# Patient Record
Sex: FEMALE | Race: WHITE | Employment: OTHER | URBAN - NONMETROPOLITAN AREA
[De-identification: names, ages, dates, MRNs, and addresses within clinical notes are randomized per-mention and may not be internally consistent; named-entity substitution may affect disease eponyms.]

---

## 2017-07-23 PROBLEM — C50.511 BREAST CANCER OF LOWER-OUTER QUADRANT OF RIGHT FEMALE BREAST (HCC): Status: ACTIVE | Noted: 2017-07-23

## 2017-07-24 ENCOUNTER — TELEPHONE (OUTPATIENT)
Dept: SURGERY | Age: 62
End: 2017-07-24

## 2017-07-24 ENCOUNTER — OFFICE VISIT (OUTPATIENT)
Dept: SURGERY | Age: 62
End: 2017-07-24
Payer: COMMERCIAL

## 2017-07-24 ENCOUNTER — HOSPITAL ENCOUNTER (OUTPATIENT)
Age: 62
Discharge: HOME | End: 2017-07-24
Payer: COMMERCIAL

## 2017-07-24 VITALS
DIASTOLIC BLOOD PRESSURE: 70 MMHG | BODY MASS INDEX: 37.89 KG/M2 | OXYGEN SATURATION: 98 % | WEIGHT: 250 LBS | SYSTOLIC BLOOD PRESSURE: 166 MMHG | HEART RATE: 110 BPM | TEMPERATURE: 99.3 F | HEIGHT: 68 IN | RESPIRATION RATE: 16 BRPM

## 2017-07-24 DIAGNOSIS — Z01.818 PRE-OP TESTING: ICD-10-CM

## 2017-07-24 DIAGNOSIS — I10 ESSENTIAL HYPERTENSION: Primary | ICD-10-CM

## 2017-07-24 DIAGNOSIS — C50.511 BREAST CANCER OF LOWER-OUTER QUADRANT OF RIGHT FEMALE BREAST (HCC): ICD-10-CM

## 2017-07-24 DIAGNOSIS — I10 ESSENTIAL HYPERTENSION: ICD-10-CM

## 2017-07-24 LAB
ANION GAP SERPL CALCULATED.3IONS-SCNC: 15 MEQ/L (ref 8–16)
BUN BLDV-MCNC: 12 MG/DL (ref 7–22)
CALCIUM SERPL-MCNC: 9 MG/DL (ref 8.5–10.5)
CHLORIDE BLD-SCNC: 103 MEQ/L (ref 98–111)
CO2: 24 MEQ/L (ref 23–33)
CREAT SERPL-MCNC: 0.7 MG/DL (ref 0.4–1.2)
GFR SERPL CREATININE-BSD FRML MDRD: 85 ML/MIN/1.73M2
GLUCOSE BLD-MCNC: 92 MG/DL (ref 70–108)
HCT VFR BLD CALC: 39.3 % (ref 37–47)
HEMOGLOBIN: 13.2 GM/DL (ref 12–16)
POTASSIUM SERPL-SCNC: 4.2 MEQ/L (ref 3.5–5.2)
SODIUM BLD-SCNC: 142 MEQ/L (ref 135–145)

## 2017-07-24 PROCEDURE — 93005 ELECTROCARDIOGRAM TRACING: CPT

## 2017-07-24 PROCEDURE — 99205 OFFICE O/P NEW HI 60 MIN: CPT | Performed by: SURGERY

## 2017-07-24 PROCEDURE — 85014 HEMATOCRIT: CPT

## 2017-07-24 PROCEDURE — 85018 HEMOGLOBIN: CPT

## 2017-07-24 PROCEDURE — 80048 BASIC METABOLIC PNL TOTAL CA: CPT

## 2017-07-24 PROCEDURE — 36415 COLL VENOUS BLD VENIPUNCTURE: CPT

## 2017-07-24 ASSESSMENT — ENCOUNTER SYMPTOMS
CONSTIPATION: 0
VOICE CHANGE: 0
FACIAL SWELLING: 0
WHEEZING: 0
ABDOMINAL PAIN: 0
EYE REDNESS: 0
PHOTOPHOBIA: 0
CHOKING: 0
RECTAL PAIN: 0
CHEST TIGHTNESS: 0
NAUSEA: 0
APNEA: 0
ANAL BLEEDING: 0
EYE ITCHING: 0
TROUBLE SWALLOWING: 0
EYE PAIN: 0
COUGH: 0
SORE THROAT: 0
BLOOD IN STOOL: 0
STRIDOR: 0
SINUS PRESSURE: 0
DIARRHEA: 0
COLOR CHANGE: 0
SHORTNESS OF BREATH: 0
EYE DISCHARGE: 0
ABDOMINAL DISTENTION: 0
BACK PAIN: 0
VOMITING: 0
RHINORRHEA: 0

## 2017-07-25 ENCOUNTER — PREP FOR PROCEDURE (OUTPATIENT)
Dept: SURGERY | Age: 62
End: 2017-07-25

## 2017-07-25 RX ORDER — SODIUM CHLORIDE 0.9 % (FLUSH) 0.9 %
10 SYRINGE (ML) INJECTION EVERY 12 HOURS SCHEDULED
Status: CANCELLED | OUTPATIENT
Start: 2017-07-25

## 2017-07-25 RX ORDER — SODIUM CHLORIDE, SODIUM LACTATE, POTASSIUM CHLORIDE, CALCIUM CHLORIDE 600; 310; 30; 20 MG/100ML; MG/100ML; MG/100ML; MG/100ML
INJECTION, SOLUTION INTRAVENOUS CONTINUOUS
Status: CANCELLED | OUTPATIENT
Start: 2017-07-25

## 2017-07-25 RX ORDER — SODIUM CHLORIDE 0.9 % (FLUSH) 0.9 %
10 SYRINGE (ML) INJECTION PRN
Status: CANCELLED | OUTPATIENT
Start: 2017-07-25

## 2017-07-26 LAB
EKG ATRIAL RATE: 89 BPM
EKG P AXIS: 61 DEGREES
EKG P-R INTERVAL: 162 MS
EKG Q-T INTERVAL: 360 MS
EKG QRS DURATION: 78 MS
EKG QTC CALCULATION (BAZETT): 438 MS
EKG R AXIS: 20 DEGREES
EKG T AXIS: 10 DEGREES
EKG VENTRICULAR RATE: 89 BPM

## 2017-07-27 ENCOUNTER — HOSPITAL ENCOUNTER (OUTPATIENT)
Dept: NUCLEAR MEDICINE | Age: 62
Discharge: HOME | End: 2017-07-27
Payer: COMMERCIAL

## 2017-07-27 ENCOUNTER — ANESTHESIA EVENT (OUTPATIENT)
Dept: OPERATING ROOM | Age: 62
End: 2017-07-27
Payer: COMMERCIAL

## 2017-07-27 ENCOUNTER — ANESTHESIA (OUTPATIENT)
Dept: OPERATING ROOM | Age: 62
End: 2017-07-27
Payer: COMMERCIAL

## 2017-07-27 ENCOUNTER — HOSPITAL ENCOUNTER (OUTPATIENT)
Age: 62
Setting detail: OUTPATIENT SURGERY
Discharge: HOME | End: 2017-07-27
Attending: SURGERY | Admitting: SURGERY
Payer: COMMERCIAL

## 2017-07-27 VITALS
DIASTOLIC BLOOD PRESSURE: 81 MMHG | OXYGEN SATURATION: 100 % | WEIGHT: 246.6 LBS | RESPIRATION RATE: 16 BRPM | TEMPERATURE: 98.6 F | HEART RATE: 76 BPM | BODY MASS INDEX: 37.38 KG/M2 | SYSTOLIC BLOOD PRESSURE: 144 MMHG | HEIGHT: 68 IN

## 2017-07-27 VITALS — DIASTOLIC BLOOD PRESSURE: 65 MMHG | SYSTOLIC BLOOD PRESSURE: 132 MMHG | OXYGEN SATURATION: 95 %

## 2017-07-27 VITALS
SYSTOLIC BLOOD PRESSURE: 201 MMHG | WEIGHT: 246.6 LBS | BODY MASS INDEX: 37.5 KG/M2 | OXYGEN SATURATION: 96 % | TEMPERATURE: 96.3 F | DIASTOLIC BLOOD PRESSURE: 88 MMHG | RESPIRATION RATE: 16 BRPM | HEART RATE: 98 BPM

## 2017-07-27 DIAGNOSIS — C50.511 BREAST CANCER OF LOWER-OUTER QUADRANT OF RIGHT FEMALE BREAST (HCC): ICD-10-CM

## 2017-07-27 DIAGNOSIS — C50.511 BREAST CANCER OF LOWER-OUTER QUADRANT OF RIGHT FEMALE BREAST (HCC): Primary | ICD-10-CM

## 2017-07-27 LAB — POTASSIUM SERPL-SCNC: 4.4 MEQ/L (ref 3.5–5.2)

## 2017-07-27 PROCEDURE — 3430000000 HC RX DIAGNOSTIC RADIOPHARMACEUTICAL: Performed by: SURGERY

## 2017-07-27 PROCEDURE — 38792 RA TRACER ID OF SENTINL NODE: CPT | Performed by: SURGERY

## 2017-07-27 PROCEDURE — 2500000003 HC RX 250 WO HCPCS: Performed by: SURGERY

## 2017-07-27 PROCEDURE — 38792 RA TRACER ID OF SENTINL NODE: CPT

## 2017-07-27 PROCEDURE — 7100000011 HC PHASE II RECOVERY - ADDTL 15 MIN: Performed by: SURGERY

## 2017-07-27 PROCEDURE — 3600000003 HC SURGERY LEVEL 3 BASE: Performed by: SURGERY

## 2017-07-27 PROCEDURE — 19301 PARTIAL MASTECTOMY: CPT | Performed by: SURGERY

## 2017-07-27 PROCEDURE — 88307 TISSUE EXAM BY PATHOLOGIST: CPT

## 2017-07-27 PROCEDURE — 7100000001 HC PACU RECOVERY - ADDTL 15 MIN: Performed by: SURGERY

## 2017-07-27 PROCEDURE — A4648 IMPLANTABLE TISSUE MARKER: HCPCS | Performed by: SURGERY

## 2017-07-27 PROCEDURE — 6370000000 HC RX 637 (ALT 250 FOR IP): Performed by: SURGERY

## 2017-07-27 PROCEDURE — 6360000002 HC RX W HCPCS: Performed by: SURGERY

## 2017-07-27 PROCEDURE — A9541 TC99M SULFUR COLLOID: HCPCS | Performed by: SURGERY

## 2017-07-27 PROCEDURE — 7100000010 HC PHASE II RECOVERY - FIRST 15 MIN: Performed by: SURGERY

## 2017-07-27 PROCEDURE — 6360000002 HC RX W HCPCS: Performed by: ANESTHESIOLOGY

## 2017-07-27 PROCEDURE — 38525 BIOPSY/REMOVAL LYMPH NODES: CPT | Performed by: SURGERY

## 2017-07-27 PROCEDURE — 84132 ASSAY OF SERUM POTASSIUM: CPT

## 2017-07-27 PROCEDURE — 3700000001 HC ADD 15 MINUTES (ANESTHESIA): Performed by: SURGERY

## 2017-07-27 PROCEDURE — 2580000003 HC RX 258: Performed by: SURGERY

## 2017-07-27 PROCEDURE — 3700000000 HC ANESTHESIA ATTENDED CARE: Performed by: SURGERY

## 2017-07-27 PROCEDURE — 3600000013 HC SURGERY LEVEL 3 ADDTL 15MIN: Performed by: SURGERY

## 2017-07-27 PROCEDURE — 2580000003 HC RX 258: Performed by: ANESTHESIOLOGY

## 2017-07-27 PROCEDURE — 36415 COLL VENOUS BLD VENIPUNCTURE: CPT

## 2017-07-27 PROCEDURE — 7100000000 HC PACU RECOVERY - FIRST 15 MIN: Performed by: SURGERY

## 2017-07-27 RX ORDER — KETOROLAC TROMETHAMINE 30 MG/ML
INJECTION, SOLUTION INTRAMUSCULAR; INTRAVENOUS
Status: DISCONTINUED
Start: 2017-07-27 | End: 2017-07-27 | Stop reason: HOSPADM

## 2017-07-27 RX ORDER — KETOROLAC TROMETHAMINE 30 MG/ML
30 INJECTION, SOLUTION INTRAMUSCULAR; INTRAVENOUS EVERY 6 HOURS
Status: DISCONTINUED | OUTPATIENT
Start: 2017-07-27 | End: 2017-07-27 | Stop reason: HOSPADM

## 2017-07-27 RX ORDER — PROPOFOL 10 MG/ML
INJECTION, EMULSION INTRAVENOUS CONTINUOUS PRN
Status: DISCONTINUED | OUTPATIENT
Start: 2017-07-27 | End: 2017-07-27

## 2017-07-27 RX ORDER — MIDAZOLAM HYDROCHLORIDE 1 MG/ML
INJECTION INTRAMUSCULAR; INTRAVENOUS PRN
Status: DISCONTINUED | OUTPATIENT
Start: 2017-07-27 | End: 2017-07-27 | Stop reason: SDUPTHER

## 2017-07-27 RX ORDER — SODIUM CHLORIDE 0.9 % (FLUSH) 0.9 %
10 SYRINGE (ML) INJECTION PRN
Status: DISCONTINUED | OUTPATIENT
Start: 2017-07-27 | End: 2017-07-27 | Stop reason: HOSPADM

## 2017-07-27 RX ORDER — ACETAMINOPHEN 325 MG/1
650 TABLET ORAL EVERY 4 HOURS PRN
Status: DISCONTINUED | OUTPATIENT
Start: 2017-07-27 | End: 2017-07-27 | Stop reason: HOSPADM

## 2017-07-27 RX ORDER — PROPOFOL 10 MG/ML
INJECTION, EMULSION INTRAVENOUS CONTINUOUS PRN
Status: DISCONTINUED | OUTPATIENT
Start: 2017-07-27 | End: 2017-07-27 | Stop reason: SDUPTHER

## 2017-07-27 RX ORDER — MORPHINE SULFATE 2 MG/ML
4 INJECTION, SOLUTION INTRAMUSCULAR; INTRAVENOUS
Status: DISCONTINUED | OUTPATIENT
Start: 2017-07-27 | End: 2017-07-27 | Stop reason: HOSPADM

## 2017-07-27 RX ORDER — FENTANYL CITRATE 50 UG/ML
INJECTION, SOLUTION INTRAMUSCULAR; INTRAVENOUS PRN
Status: DISCONTINUED | OUTPATIENT
Start: 2017-07-27 | End: 2017-07-27 | Stop reason: SDUPTHER

## 2017-07-27 RX ORDER — HYDROCODONE BITARTRATE AND ACETAMINOPHEN 5; 325 MG/1; MG/1
1-2 TABLET ORAL EVERY 6 HOURS PRN
Qty: 40 TABLET | Refills: 0 | Status: SHIPPED | OUTPATIENT
Start: 2017-07-27 | End: 2017-08-02

## 2017-07-27 RX ORDER — SODIUM CHLORIDE, SODIUM LACTATE, POTASSIUM CHLORIDE, CALCIUM CHLORIDE 600; 310; 30; 20 MG/100ML; MG/100ML; MG/100ML; MG/100ML
INJECTION, SOLUTION INTRAVENOUS CONTINUOUS
Status: DISCONTINUED | OUTPATIENT
Start: 2017-07-27 | End: 2017-07-27 | Stop reason: HOSPADM

## 2017-07-27 RX ORDER — MORPHINE SULFATE 2 MG/ML
2 INJECTION, SOLUTION INTRAMUSCULAR; INTRAVENOUS
Status: DISCONTINUED | OUTPATIENT
Start: 2017-07-27 | End: 2017-07-27 | Stop reason: HOSPADM

## 2017-07-27 RX ORDER — ONDANSETRON 2 MG/ML
4 INJECTION INTRAMUSCULAR; INTRAVENOUS EVERY 6 HOURS PRN
Status: DISCONTINUED | OUTPATIENT
Start: 2017-07-27 | End: 2017-07-27 | Stop reason: HOSPADM

## 2017-07-27 RX ORDER — SODIUM CHLORIDE 0.9 % (FLUSH) 0.9 %
10 SYRINGE (ML) INJECTION EVERY 12 HOURS SCHEDULED
Status: DISCONTINUED | OUTPATIENT
Start: 2017-07-27 | End: 2017-07-27 | Stop reason: HOSPADM

## 2017-07-27 RX ORDER — SODIUM CHLORIDE 9 MG/ML
INJECTION, SOLUTION INTRAVENOUS CONTINUOUS PRN
Status: DISCONTINUED | OUTPATIENT
Start: 2017-07-27 | End: 2017-07-27 | Stop reason: SDUPTHER

## 2017-07-27 RX ORDER — HYDROCODONE BITARTRATE AND ACETAMINOPHEN 5; 325 MG/1; MG/1
2 TABLET ORAL EVERY 4 HOURS PRN
Status: DISCONTINUED | OUTPATIENT
Start: 2017-07-27 | End: 2017-07-27 | Stop reason: HOSPADM

## 2017-07-27 RX ORDER — PROPOFOL 10 MG/ML
INJECTION, EMULSION INTRAVENOUS PRN
Status: DISCONTINUED | OUTPATIENT
Start: 2017-07-27 | End: 2017-07-27 | Stop reason: SDUPTHER

## 2017-07-27 RX ORDER — HYDROCODONE BITARTRATE AND ACETAMINOPHEN 5; 325 MG/1; MG/1
1 TABLET ORAL EVERY 4 HOURS PRN
Status: DISCONTINUED | OUTPATIENT
Start: 2017-07-27 | End: 2017-07-27 | Stop reason: HOSPADM

## 2017-07-27 RX ADMIN — MIDAZOLAM HYDROCHLORIDE 2 MG: 1 INJECTION INTRAMUSCULAR; INTRAVENOUS at 08:58

## 2017-07-27 RX ADMIN — FENTANYL CITRATE 100 MCG: 50 INJECTION INTRAMUSCULAR; INTRAVENOUS at 08:43

## 2017-07-27 RX ADMIN — CEFAZOLIN SODIUM 2 G: 2 SOLUTION INTRAVENOUS at 08:42

## 2017-07-27 RX ADMIN — HYDROCODONE BITARTRATE AND ACETAMINOPHEN 1 TABLET: 5; 325 TABLET ORAL at 10:56

## 2017-07-27 RX ADMIN — Medication 1 MILLICURIE: at 07:30

## 2017-07-27 RX ADMIN — SODIUM CHLORIDE, POTASSIUM CHLORIDE, SODIUM LACTATE AND CALCIUM CHLORIDE: 600; 310; 30; 20 INJECTION, SOLUTION INTRAVENOUS at 08:33

## 2017-07-27 RX ADMIN — PROPOFOL 410 MG: 10 INJECTION, EMULSION INTRAVENOUS at 08:45

## 2017-07-27 RX ADMIN — KETOROLAC TROMETHAMINE 30 MG: 30 INJECTION, SOLUTION INTRAMUSCULAR at 10:00

## 2017-07-27 RX ADMIN — SODIUM CHLORIDE: 9 INJECTION, SOLUTION INTRAVENOUS at 08:40

## 2017-07-27 ASSESSMENT — PULMONARY FUNCTION TESTS
PIF_VALUE: 0

## 2017-07-27 ASSESSMENT — PAIN SCALES - GENERAL
PAINLEVEL_OUTOF10: 0
PAINLEVEL_OUTOF10: 0
PAINLEVEL_OUTOF10: 7
PAINLEVEL_OUTOF10: 2
PAINLEVEL_OUTOF10: 6
PAINLEVEL_OUTOF10: 7
PAINLEVEL_OUTOF10: 0

## 2017-07-27 ASSESSMENT — PAIN DESCRIPTION - ORIENTATION: ORIENTATION: RIGHT

## 2017-07-27 ASSESSMENT — PAIN DESCRIPTION - LOCATION: LOCATION: BREAST

## 2017-07-27 ASSESSMENT — PAIN DESCRIPTION - FREQUENCY: FREQUENCY: CONTINUOUS

## 2017-07-27 ASSESSMENT — PAIN DESCRIPTION - DESCRIPTORS: DESCRIPTORS: ACHING

## 2017-07-27 ASSESSMENT — PAIN DESCRIPTION - PAIN TYPE: TYPE: SURGICAL PAIN

## 2017-07-28 ENCOUNTER — TELEPHONE (OUTPATIENT)
Dept: SURGERY | Age: 62
End: 2017-07-28

## 2017-08-01 PROBLEM — Z98.890 S/P LUMPECTOMY, RIGHT BREAST: Status: ACTIVE | Noted: 2017-07-24

## 2017-08-02 ENCOUNTER — OFFICE VISIT (OUTPATIENT)
Dept: SURGERY | Age: 62
End: 2017-08-02

## 2017-08-02 VITALS
OXYGEN SATURATION: 98 % | TEMPERATURE: 98.7 F | BODY MASS INDEX: 37.41 KG/M2 | HEART RATE: 93 BPM | WEIGHT: 246.8 LBS | SYSTOLIC BLOOD PRESSURE: 136 MMHG | HEIGHT: 68 IN | RESPIRATION RATE: 18 BRPM | DIASTOLIC BLOOD PRESSURE: 78 MMHG

## 2017-08-02 DIAGNOSIS — C50.511 MALIGNANT NEOPLASM OF LOWER-OUTER QUADRANT OF RIGHT FEMALE BREAST (HCC): Primary | ICD-10-CM

## 2017-08-02 DIAGNOSIS — Z98.890 S/P LUMPECTOMY, RIGHT BREAST: ICD-10-CM

## 2017-08-02 PROCEDURE — 99024 POSTOP FOLLOW-UP VISIT: CPT | Performed by: SURGERY

## 2017-08-03 ENCOUNTER — TELEPHONE (OUTPATIENT)
Dept: SURGERY | Age: 62
End: 2017-08-03

## 2017-08-07 ENCOUNTER — HOSPITAL ENCOUNTER (OUTPATIENT)
Dept: INFUSION THERAPY | Age: 62
Discharge: HOME | End: 2017-08-07
Payer: COMMERCIAL

## 2017-08-07 ENCOUNTER — OFFICE VISIT (OUTPATIENT)
Dept: ONCOLOGY | Age: 62
End: 2017-08-07
Payer: COMMERCIAL

## 2017-08-07 VITALS
TEMPERATURE: 97.6 F | OXYGEN SATURATION: 100 % | RESPIRATION RATE: 18 BRPM | HEART RATE: 91 BPM | WEIGHT: 249.4 LBS | HEIGHT: 68 IN | BODY MASS INDEX: 37.8 KG/M2 | SYSTOLIC BLOOD PRESSURE: 165 MMHG | DIASTOLIC BLOOD PRESSURE: 85 MMHG

## 2017-08-07 DIAGNOSIS — C50.511 MALIGNANT NEOPLASM OF LOWER-OUTER QUADRANT OF RIGHT FEMALE BREAST (HCC): Primary | ICD-10-CM

## 2017-08-07 DIAGNOSIS — Z98.890 STATUS POST RIGHT BREAST LUMPECTOMY: ICD-10-CM

## 2017-08-07 PROCEDURE — 99205 OFFICE O/P NEW HI 60 MIN: CPT | Performed by: INTERNAL MEDICINE

## 2017-08-07 PROCEDURE — 99211 OFF/OP EST MAY X REQ PHY/QHP: CPT

## 2017-08-11 ENCOUNTER — HOSPITAL ENCOUNTER (OUTPATIENT)
Dept: RADIATION ONCOLOGY | Age: 62
Discharge: HOME | End: 2017-08-11
Payer: COMMERCIAL

## 2017-08-11 PROCEDURE — 99202 OFFICE O/P NEW SF 15 MIN: CPT | Performed by: RADIOLOGY

## 2017-08-15 ASSESSMENT — ENCOUNTER SYMPTOMS
BLOOD IN STOOL: 0
VOMITING: 0
CHEST TIGHTNESS: 0
BACK PAIN: 0
FACIAL SWELLING: 0
WHEEZING: 0
COUGH: 0
NAUSEA: 0
ABDOMINAL PAIN: 0
DIARRHEA: 0
CONSTIPATION: 0
SORE THROAT: 0
EYE DISCHARGE: 0
SHORTNESS OF BREATH: 0
RECTAL PAIN: 0
TROUBLE SWALLOWING: 0
COLOR CHANGE: 0
ABDOMINAL DISTENTION: 0

## 2017-08-21 ENCOUNTER — HOSPITAL ENCOUNTER (OUTPATIENT)
Dept: INFUSION THERAPY | Age: 62
Discharge: HOME | End: 2017-08-21
Payer: COMMERCIAL

## 2017-08-21 ENCOUNTER — OFFICE VISIT (OUTPATIENT)
Dept: ONCOLOGY | Age: 62
End: 2017-08-21
Payer: COMMERCIAL

## 2017-08-21 VITALS
DIASTOLIC BLOOD PRESSURE: 87 MMHG | HEART RATE: 93 BPM | HEIGHT: 68 IN | TEMPERATURE: 98.2 F | BODY MASS INDEX: 37.47 KG/M2 | OXYGEN SATURATION: 99 % | WEIGHT: 247.2 LBS | RESPIRATION RATE: 18 BRPM | SYSTOLIC BLOOD PRESSURE: 170 MMHG

## 2017-08-21 DIAGNOSIS — C50.511 MALIGNANT NEOPLASM OF LOWER-OUTER QUADRANT OF RIGHT FEMALE BREAST (HCC): ICD-10-CM

## 2017-08-21 DIAGNOSIS — Z98.890 STATUS POST RIGHT BREAST LUMPECTOMY: Primary | ICD-10-CM

## 2017-08-21 PROCEDURE — 99213 OFFICE O/P EST LOW 20 MIN: CPT | Performed by: INTERNAL MEDICINE

## 2017-08-21 PROCEDURE — 99211 OFF/OP EST MAY X REQ PHY/QHP: CPT

## 2017-08-21 ASSESSMENT — ENCOUNTER SYMPTOMS
COLOR CHANGE: 0
DIARRHEA: 0
FACIAL SWELLING: 0
SHORTNESS OF BREATH: 0
WHEEZING: 0
BLOOD IN STOOL: 0
NAUSEA: 0
CHEST TIGHTNESS: 0
RECTAL PAIN: 0
SORE THROAT: 0
VOMITING: 0
EYE DISCHARGE: 0
TROUBLE SWALLOWING: 0
ABDOMINAL PAIN: 0
COUGH: 0
BACK PAIN: 0
CONSTIPATION: 0
ABDOMINAL DISTENTION: 0

## 2017-09-05 ENCOUNTER — HOSPITAL ENCOUNTER (OUTPATIENT)
Dept: RADIATION ONCOLOGY | Age: 62
Discharge: HOME | End: 2017-09-05
Payer: COMMERCIAL

## 2017-09-05 ENCOUNTER — HOSPITAL ENCOUNTER (OUTPATIENT)
Dept: CT IMAGING | Age: 62
Discharge: HOME | End: 2017-09-05
Payer: COMMERCIAL

## 2017-09-05 DIAGNOSIS — C50.511 MALIGNANT NEOPLASM OF LOWER-OUTER QUADRANT OF RIGHT FEMALE BREAST (HCC): ICD-10-CM

## 2017-09-05 PROCEDURE — 77290 THER RAD SIMULAJ FIELD CPLX: CPT | Performed by: RADIOLOGY

## 2017-09-05 PROCEDURE — 3209999900 CT GUIDE RADIATION THERAPY NO CHARGE

## 2017-09-05 PROCEDURE — 77334 RADIATION TREATMENT AID(S): CPT | Performed by: RADIOLOGY

## 2017-09-06 ENCOUNTER — OFFICE VISIT (OUTPATIENT)
Dept: SURGERY | Age: 62
End: 2017-09-06

## 2017-09-06 VITALS
HEIGHT: 68 IN | SYSTOLIC BLOOD PRESSURE: 124 MMHG | RESPIRATION RATE: 18 BRPM | TEMPERATURE: 99.5 F | BODY MASS INDEX: 37.41 KG/M2 | OXYGEN SATURATION: 97 % | DIASTOLIC BLOOD PRESSURE: 76 MMHG | HEART RATE: 84 BPM | WEIGHT: 246.8 LBS

## 2017-09-06 DIAGNOSIS — Z98.890 S/P LUMPECTOMY, RIGHT BREAST: ICD-10-CM

## 2017-09-06 DIAGNOSIS — C50.511 MALIGNANT NEOPLASM OF LOWER-OUTER QUADRANT OF RIGHT FEMALE BREAST (HCC): Primary | ICD-10-CM

## 2017-09-06 PROCEDURE — 99024 POSTOP FOLLOW-UP VISIT: CPT | Performed by: SURGERY

## 2017-09-11 ENCOUNTER — HOSPITAL ENCOUNTER (OUTPATIENT)
Dept: RADIATION ONCOLOGY | Age: 62
Discharge: HOME | End: 2017-09-11
Payer: COMMERCIAL

## 2017-09-11 PROCEDURE — 77295 3-D RADIOTHERAPY PLAN: CPT | Performed by: RADIOLOGY

## 2017-09-11 PROCEDURE — 77300 RADIATION THERAPY DOSE PLAN: CPT | Performed by: RADIOLOGY

## 2017-09-13 PROCEDURE — 77280 THER RAD SIMULAJ FIELD SMPL: CPT | Performed by: RADIOLOGY

## 2017-09-13 PROCEDURE — 77412 RADIATION TX DELIVERY LVL 3: CPT | Performed by: RADIOLOGY

## 2017-09-13 PROCEDURE — 77334 RADIATION TREATMENT AID(S): CPT | Performed by: RADIOLOGY

## 2017-09-14 PROCEDURE — 77412 RADIATION TX DELIVERY LVL 3: CPT

## 2017-09-15 PROCEDURE — 77412 RADIATION TX DELIVERY LVL 3: CPT | Performed by: RADIOLOGY

## 2017-09-18 ENCOUNTER — HOSPITAL ENCOUNTER (OUTPATIENT)
Dept: RADIATION ONCOLOGY | Age: 62
Discharge: HOME | End: 2017-09-18
Payer: COMMERCIAL

## 2017-09-18 PROCEDURE — 77412 RADIATION TX DELIVERY LVL 3: CPT | Performed by: RADIOLOGY

## 2017-09-18 PROCEDURE — 77336 RADIATION PHYSICS CONSULT: CPT | Performed by: RADIOLOGY

## 2017-09-19 PROCEDURE — 77412 RADIATION TX DELIVERY LVL 3: CPT | Performed by: RADIOLOGY

## 2017-09-20 PROCEDURE — 77412 RADIATION TX DELIVERY LVL 3: CPT | Performed by: RADIOLOGY

## 2017-09-20 PROCEDURE — 77417 THER RADIOLOGY PORT IMAGE(S): CPT | Performed by: RADIOLOGY

## 2017-09-21 PROCEDURE — 77412 RADIATION TX DELIVERY LVL 3: CPT | Performed by: RADIOLOGY

## 2017-09-22 ENCOUNTER — HOSPITAL ENCOUNTER (OUTPATIENT)
Dept: CT IMAGING | Age: 62
Discharge: HOME | End: 2017-09-22
Payer: COMMERCIAL

## 2017-09-22 DIAGNOSIS — C50.511 MALIGNANT NEOPLASM OF LOWER-OUTER QUADRANT OF RIGHT FEMALE BREAST (HCC): ICD-10-CM

## 2017-09-22 PROCEDURE — 3209999900 CT GUIDE RADIATION THERAPY NO CHARGE

## 2017-09-22 PROCEDURE — 77290 THER RAD SIMULAJ FIELD CPLX: CPT | Performed by: RADIOLOGY

## 2017-09-22 PROCEDURE — 77412 RADIATION TX DELIVERY LVL 3: CPT | Performed by: RADIOLOGY

## 2017-09-22 PROCEDURE — 77334 RADIATION TREATMENT AID(S): CPT | Performed by: RADIOLOGY

## 2017-09-25 ENCOUNTER — HOSPITAL ENCOUNTER (OUTPATIENT)
Dept: RADIATION ONCOLOGY | Age: 62
Discharge: HOME | End: 2017-09-25
Payer: COMMERCIAL

## 2017-09-25 PROCEDURE — 77412 RADIATION TX DELIVERY LVL 3: CPT | Performed by: RADIOLOGY

## 2017-09-25 PROCEDURE — 77336 RADIATION PHYSICS CONSULT: CPT | Performed by: RADIOLOGY

## 2017-09-26 PROCEDURE — 77412 RADIATION TX DELIVERY LVL 3: CPT | Performed by: RADIOLOGY

## 2017-09-27 PROCEDURE — 77417 THER RADIOLOGY PORT IMAGE(S): CPT | Performed by: RADIOLOGY

## 2017-09-27 PROCEDURE — 77412 RADIATION TX DELIVERY LVL 3: CPT | Performed by: RADIOLOGY

## 2017-09-28 PROCEDURE — 77334 RADIATION TREATMENT AID(S): CPT | Performed by: RADIOLOGY

## 2017-09-28 PROCEDURE — 77412 RADIATION TX DELIVERY LVL 3: CPT | Performed by: RADIOLOGY

## 2017-09-28 PROCEDURE — 77321 SPECIAL TELETX PORT PLAN: CPT | Performed by: RADIOLOGY

## 2017-09-29 PROCEDURE — 77412 RADIATION TX DELIVERY LVL 3: CPT | Performed by: RADIOLOGY

## 2017-10-02 ENCOUNTER — HOSPITAL ENCOUNTER (OUTPATIENT)
Dept: RADIATION ONCOLOGY | Age: 62
Discharge: HOME | End: 2017-10-02
Payer: COMMERCIAL

## 2017-10-02 ENCOUNTER — HOSPITAL ENCOUNTER (OUTPATIENT)
Dept: INFUSION THERAPY | Age: 62
Discharge: HOME | End: 2017-10-02
Payer: COMMERCIAL

## 2017-10-02 ENCOUNTER — OFFICE VISIT (OUTPATIENT)
Dept: ONCOLOGY | Age: 62
End: 2017-10-02
Payer: COMMERCIAL

## 2017-10-02 ENCOUNTER — NURSE ONLY (OUTPATIENT)
Dept: INFUSION THERAPY | Age: 62
End: 2017-10-02

## 2017-10-02 VITALS
BODY MASS INDEX: 37.19 KG/M2 | SYSTOLIC BLOOD PRESSURE: 156 MMHG | HEIGHT: 68 IN | RESPIRATION RATE: 18 BRPM | HEART RATE: 80 BPM | WEIGHT: 245.4 LBS | OXYGEN SATURATION: 100 % | TEMPERATURE: 97.6 F | DIASTOLIC BLOOD PRESSURE: 83 MMHG

## 2017-10-02 DIAGNOSIS — Z98.890 S/P LUMPECTOMY, RIGHT BREAST: Primary | ICD-10-CM

## 2017-10-02 DIAGNOSIS — Z79.811 PROPHYLACTIC USE OF ANASTROZOLE (ARIMIDEX): ICD-10-CM

## 2017-10-02 DIAGNOSIS — C50.511 MALIGNANT NEOPLASM OF LOWER-OUTER QUADRANT OF RIGHT FEMALE BREAST (HCC): ICD-10-CM

## 2017-10-02 PROCEDURE — 99211 OFF/OP EST MAY X REQ PHY/QHP: CPT

## 2017-10-02 PROCEDURE — 99214 OFFICE O/P EST MOD 30 MIN: CPT | Performed by: INTERNAL MEDICINE

## 2017-10-02 PROCEDURE — 77412 RADIATION TX DELIVERY LVL 3: CPT

## 2017-10-02 PROCEDURE — 77336 RADIATION PHYSICS CONSULT: CPT | Performed by: RADIOLOGY

## 2017-10-02 ASSESSMENT — ENCOUNTER SYMPTOMS
BLOOD IN STOOL: 0
FACIAL SWELLING: 0
SORE THROAT: 0
EYE DISCHARGE: 0
BACK PAIN: 0
VOMITING: 0
RECTAL PAIN: 0
DIARRHEA: 0
CONSTIPATION: 0
SHORTNESS OF BREATH: 0
ABDOMINAL DISTENTION: 0
COLOR CHANGE: 0
TROUBLE SWALLOWING: 0
ABDOMINAL PAIN: 0
NAUSEA: 0
COUGH: 0
WHEEZING: 0
CHEST TIGHTNESS: 0

## 2017-10-02 NOTE — MR AVS SNAPSHOT
Your BMI as listed above is considered obese (30 or more). BMI is an estimate of body fat, calculated from your height and weight. The higher your BMI, the greater your risk of heart disease, high blood pressure, type 2 diabetes, stroke, gallstones, arthritis, sleep apnea, and certain cancers. BMI is not perfect. It may overestimate body fat in athletes and people who are more muscular. Even a small weight loss (between 5 and 10 percent of your current weight) by decreasing your calorie intake and becoming more physically active will help lower your risk of developing or worsening diseases associated with obesity. Learn more at: [email or web address]          Instructions    1. Start Arimidex November 1, 2017  2. Bone density sometime in November 2017  3. RTC in 4 months            Medications and Orders      Your Current Medications Are              losartan (COZAAR) 100 MG tablet Take 100 mg by mouth daily    aspirin 81 MG chewable tablet Take 81 mg by mouth daily    Multiple Vitamins-Minerals (THERAPEUTIC MULTIVITAMIN-MINERALS) tablet Take 1 tablet by mouth daily    doxycycline Monohydrate (VIBRAMYCIN) 25 MG/5ML SUSR suspension Take 50 mg by mouth daily    calcium carbonate 600 MG TABS tablet Take 1 tablet by mouth daily      Allergies           No Known Allergies         Additional Information        Basic Information     Date Of Birth Sex Race Ethnicity Preferred Language Preferred Written Language    1955 Female White Non-/Non  English English      Problem List as of 10/2/2017                 S/P lumpectomy and sln , right breast    Malignant neoplasm of lower-outer quadrant of right female breast [facility])      Preventive Care        Date Due    Hepatitis C screening is recommended for all adults regardless of risk factors born between [facility] at least once (lifetime) who have never been tested.  1955 HIV screening is recommended for all people regardless of risk factors  aged 15-65 years at least once (lifetime) who have never been HIV tested. 2/2/1970    Tetanus Combination Vaccine (1 - Tdap) 2/2/1974    Cholesterol Screening 2/2/1995    Colonoscopy 2/2/2005    Zoster Vaccine 2/2/2015    Yearly Flu Vaccine (1) 9/1/2017    Mammograms are recommended every 2 years for low/average risk patients aged 48 - 69, and every year for high risk patients per updated national guidelines. However these guidelines can be individualized by your provider. 7/10/2018            path intelligencehart Signup           [employer] allows you to send messages to your doctor, view your test results, renew your prescriptions, schedule appointments, view visit notes, and more. How Do I Sign Up? 1. In your Internet browser, go to [email or web address]  2. Click on the Sign Up Now link in the Sign In box. You will see the New Member Sign Up page. 3. Enter your [employer] Access Code exactly as it appears below. You will not need to use this code after youve completed the sign-up process. If you do not sign up before the expiration date, you must request a new code. [employer] Access Code: [email or web address]  Expires: 11/17/2017  5:00 AM    4. Enter your Social Security Number ([id number]) and Date of Birth (mm/dd/yyyy) as indicated and click Submit. You will be taken to the next sign-up page. 5. Create a [employer] ID. This will be your [employer] login ID and cannot be changed, so think of one that is secure and easy to remember. 6. Create a [employer] password. You can change your password at any time. 7. Enter your Password Reset Question and Answer. This can be used at a later time if you forget your password. 8. Enter your e-mail address. You will receive e-mail notification when new information is available in [address] 9. Click Sign Up. You can now view your medical record.      Additional Information If you have questions, please contact the physician practice where you receive care. Remember, MyChart is NOT to be used for urgent needs. For medical emergencies, dial 911. For questions regarding your MyChart account call [phone]. If you have a clinical question, please call your doctor's office.

## 2017-10-02 NOTE — PATIENT INSTRUCTIONS
1.  Start Arimidex November 1, 2017  2. Bone density sometime in November 2017  3.   RTC in 4 months

## 2017-10-02 NOTE — PROGRESS NOTES
7/10/2017 CLINICAL: Post biopsy clip placement, right breast.  Comparison is made to exams dated:  7/10/2017 ultrasound biopsy, 7/10/2017 aspiration, 7/10/2017 ultrasound biopsy, 2017 ultrasound, 2017 mammogram - [address], and 3/5/2010 mammogram - [facility]. There are scattered fibroglandular elements in the right breast that could obscure a lesion on mammography. Current study was also evaluated with a Computer Aided Detection (CAD) system. There is a barbell marker clip in the appropriate position in the  right axillary tail. This marker clip placement is at biopsy site. There also is a tribell marker clip in the appropriate position in the right breast lower outer aspect posterior depth. This marker clip placement is at biopsy site. Salima Santana M.D.          pgk/:7/10/2017 10:16:17  copy to: Ladonna Dakins MD, Norma Yadav MD, [address] [place] [address]., ph: [phone], fax: [phone] Imaging Technologist: Avery Vazquez RT(R)(M)[facility], [address] [facility] Breast Biopsy Ultrasound    Result Date: 7/10/2017  THIS REPORT HAS BEEN AMENDED. AMENDMENT: 2017   Salima Santana M.D. Surgical pathology reports invasive ductal carcinoma, grade 1 The sonographic features are concordant BIRADS 6 Surgical referral recommended IMPRESSION: ULTRASOUND GUIDED BIOPSY 1. Ultrasound guided biopsy of the mass in the right breast lower  outer aspect posterior depth was successful with no apparent post procedure complications. 2. Waiting for pathology results. A final report will be issued when these become available. #[id number] - [facility] BREAST BIOPSY ULTRASOUND GUIDED BIOPSY RIGHT BREAST WITH MARKING DEVICE INSERTED AND POST DIGITAL MAMMOGRAPHIC IMAGIN/10/2017 CLINICAL: Mass right breast, tribell clip placed. Correlation is made to exam dated:  2017 ultrasound - [address]   An ultrasound guided biopsy using real-time ultrasound was performed for the mass located in the right breast lower outer aspect posterior depth. This was described on the previous ultrasound report. The skin was prepped in the usual manner. Local anesthetic was administered to the access site. A skin nick was made in the breast.  The abnormality was approached from  the lateral aspect. A 14 gauge biopsy needle was placed adjacent to the abnormality through an introducer device under ultrasound guidance. Once the needle was documented to be in the  correct location, four cores were obtained using Sertera. A tribell clip was inserted into the biopsy cavity. A skin closure  strip was applied to the access site. As a separate procedure in a separate room with a dedicated machine, post procedure digital mammographic imaging demonstrates the clip at the targeted area. The specimens were sent to the laboratory for pathological analysis. Mary Biggs M.D.  pgk/:7/10/2017 10:13:15  copy to: Sukhwinder Rascon MD, [facility], [facility] ph: [phone], fax: [phone] Imaging Technologist: June Mcclain RT(R)(M)[facility]    [phone]     Lab Results   Component Value Date    HGB 13.2 07/24/2017    HCT 39.3 07/24/2017       Chemistry        Component Value Date/Time     07/24/2017 1710    K 4.4 07/27/2017 0805     07/24/2017 1710    CO2 24 07/24/2017 1710    BUN 12 07/24/2017 1710    CREATININE 0.7 07/24/2017 1710        Component Value Date/Time    CALCIUM 9.0 07/24/2017 1710          Assessment:   1. Stage II A, hormone responsive, HER-2 negative right breast cancer. Status post right breast lumpectomy and sentinel lymph node biopsy. Oncotype DX assay showed RS was 17, placing her into low risk disease. Currently undergoing adjuvant radiation treatment to the right breast    Since the patient is a postmenopausal her best hormonal treatment option is AI.  Side effects of Arimidex explained: including but not limited to hot flashes, joints and bones aches, osteoporosis, edema, rash, GI upset, mood changes, dyslipidemia, thromboembolic disorder (rare, more so with tamoxifen), hypersensitivity. Today the patient received prescription for Arimidex and was instruction to take one tablet daily, starting on November 1. She will continue anti-estrogen therapy for total of 5 years. The patient agreed with the plan      No diagnosis found. Plan:   No Follow-up on file. Orders Placed:   No orders of the defined types were placed in this encounter. Medications Prescribed:   No orders of the defined types were placed in this encounter.

## 2017-10-03 ENCOUNTER — TELEPHONE (OUTPATIENT)
Dept: ONCOLOGY | Age: 62
End: 2017-10-03

## 2017-10-03 PROCEDURE — 77412 RADIATION TX DELIVERY LVL 3: CPT | Performed by: RADIOLOGY

## 2017-10-03 RX ORDER — ANASTROZOLE 1 MG/1
1 TABLET ORAL DAILY
Qty: 90 TABLET | Refills: 3 | Status: SHIPPED | OUTPATIENT
Start: 2017-10-03 | End: 2018-10-10 | Stop reason: SDUPTHER

## 2017-10-03 NOTE — TELEPHONE ENCOUNTER
Patient is calling in stating that she needs to be on Arimidex. Patient needs to use [facility].

## 2017-10-04 DIAGNOSIS — C50.511 MALIGNANT NEOPLASM OF LOWER-OUTER QUADRANT OF RIGHT FEMALE BREAST (HCC): Primary | ICD-10-CM

## 2017-10-04 PROCEDURE — 77417 THER RADIOLOGY PORT IMAGE(S): CPT | Performed by: RADIOLOGY

## 2017-10-04 PROCEDURE — 77412 RADIATION TX DELIVERY LVL 3: CPT | Performed by: RADIOLOGY

## 2017-10-05 PROCEDURE — 77412 RADIATION TX DELIVERY LVL 3: CPT

## 2017-10-05 PROCEDURE — 77280 THER RAD SIMULAJ FIELD SMPL: CPT

## 2017-10-06 PROCEDURE — 77412 RADIATION TX DELIVERY LVL 3: CPT | Performed by: RADIOLOGY

## 2017-10-09 ENCOUNTER — HOSPITAL ENCOUNTER (OUTPATIENT)
Dept: RADIATION ONCOLOGY | Age: 62
Discharge: HOME | End: 2017-10-09
Payer: COMMERCIAL

## 2017-10-09 PROCEDURE — 77412 RADIATION TX DELIVERY LVL 3: CPT

## 2017-10-09 PROCEDURE — 77336 RADIATION PHYSICS CONSULT: CPT

## 2017-10-10 PROCEDURE — 77412 RADIATION TX DELIVERY LVL 3: CPT | Performed by: RADIOLOGY

## 2017-10-10 NOTE — PROGRESS NOTES
Ratin-2/10 (occasional breast tenderness) ECOG Performance Status: 0    Treatment Tolerance/Response: Sonya tolerated her radiation therapy well. She had some mild increased fatigue and also occasional breast tenderness. She had the expected to moderate skin reaction, but without any desquamation. She did not experience any unexpected side effects during her course of therapy. Systemic Therapy: Endocrine therapy will be initiated after completion of radiation therapy. Follow Up Plan: Zak Santiago received post treatment instructions regarding skin care, etc.  She is scheduled to return for a checkup in 2017. As usual, she was instructed to call for an earlier appointment if she has any problems, questions or concerns. She will continue her care and hematology/oncology, surgery clinic, and with her other physicians. Electronically signed by Patricia Piedra MD on 10/10/2017 at 4:27 PM    Radiation Oncology      Cc:  Missy Delcid. Claudette Child; [facility] Tumor Registry      This document was created using a voice-recognition program.  Computer-generated transcription errors may be present.

## 2017-10-11 ENCOUNTER — NURSE ONLY (OUTPATIENT)
Dept: INFUSION THERAPY | Age: 62
End: 2017-10-11

## 2017-10-12 DIAGNOSIS — Z78.0 POST-MENOPAUSAL: Primary | ICD-10-CM

## 2017-11-01 ENCOUNTER — HOSPITAL ENCOUNTER (OUTPATIENT)
Dept: WOMENS IMAGING | Age: 62
Discharge: HOME | End: 2017-11-01
Payer: COMMERCIAL

## 2017-11-01 DIAGNOSIS — Z78.0 POST-MENOPAUSAL: ICD-10-CM

## 2017-11-01 PROCEDURE — 77080 DXA BONE DENSITY AXIAL: CPT

## 2017-11-27 ENCOUNTER — HOSPITAL ENCOUNTER (OUTPATIENT)
Dept: RADIATION ONCOLOGY | Age: 62
Discharge: HOME | End: 2017-11-27
Payer: COMMERCIAL

## 2017-11-27 PROCEDURE — 99211 OFF/OP EST MAY X REQ PHY/QHP: CPT

## 2017-11-27 NOTE — PROGRESS NOTES
[address] [place] [address] Street, [address]  Phone: [phone]   Toll Free: [phone]   Fax: [phone]    RADIATION ONCOLOGY FOLLOW UP REPORT    PATIENT NAME:  Kayla Arizmendi     : 1955  MEDICAL RECORD NO: [id number]    LOCATION: [facility] NO: [id number]      PROVIDER: Bishnu Spencer CNP        DATE OF SERVICE: 2017    FOLLOW UP PHYSICIANS: Dr. Charu Patterson, Dr. Melissa Savage    DIAGNOSIS: C50.511 -- well-differentiated invasive ductal carcinoma of the right lower outer quadrant female breast with associated grade 2 DCIS,pT2 N0 M0, Stage IIA, ER+, DE+, Her2-. DATE OF DIAGNOSIS: 7/10/2017    END OF TREATMENT DATE: 10/10/2017    ECOG PERFORMANCE STATUS: 0    PAIN: denies    CHAPERONE: declined      HPI: Juliana Chavez sought evaluation for palpable right breast nodule. She underwent diagnostic right mammogram and ultrasound on 2017. The showed an irregular 1.5 cm mass in the right breast lower outer aspect posterior depth. Biopsy confirmed the presence of well differentiated invasive ductal carcinoma as described above. She discussed her treatment options during her surgical consultation and elected to undergo breast conservation surgery. She underwent lumpectomy and sentinel lymph node biopsy 2017. Final pathology showed a 2.1 cm invasive cancer with associated DCIS and clear surgical margins. The sentinel lymph node was negative. Juliana Chavez has been seen in hematology oncology and Oncotype DX score was 17 placing her in the low risk category. She received a recommendation for radiation therapy after discussion regarding the role of radiation therapy in the management of her breast cancer she was agreeable to proceed with XRT. Juliana Chavez experienced a increase in fatigue, mild breast tenderness and moderate skin erythema during her treatment. INTERVAL HISTORY: Juliana Chavez denies any complaints related to her radiation treatment.  She

## 2018-02-05 ENCOUNTER — HOSPITAL ENCOUNTER (OUTPATIENT)
Dept: INFUSION THERAPY | Age: 63
Discharge: HOME | End: 2018-02-05
Payer: COMMERCIAL

## 2018-02-05 ENCOUNTER — OFFICE VISIT (OUTPATIENT)
Dept: ONCOLOGY | Age: 63
End: 2018-02-05
Payer: COMMERCIAL

## 2018-02-05 ENCOUNTER — CLINICAL DOCUMENTATION (OUTPATIENT)
Dept: CASE MANAGEMENT | Age: 63
End: 2018-02-05

## 2018-02-05 VITALS
RESPIRATION RATE: 18 BRPM | HEART RATE: 89 BPM | OXYGEN SATURATION: 98 % | WEIGHT: 251.8 LBS | DIASTOLIC BLOOD PRESSURE: 96 MMHG | HEIGHT: 68 IN | SYSTOLIC BLOOD PRESSURE: 191 MMHG | TEMPERATURE: 97.9 F | BODY MASS INDEX: 38.16 KG/M2

## 2018-02-05 DIAGNOSIS — Z78.0 POST-MENOPAUSAL: Primary | ICD-10-CM

## 2018-02-05 DIAGNOSIS — Z98.890 STATUS POST RIGHT BREAST LUMPECTOMY: ICD-10-CM

## 2018-02-05 DIAGNOSIS — Z98.890 S/P LUMPECTOMY, RIGHT BREAST: ICD-10-CM

## 2018-02-05 DIAGNOSIS — Z79.811 PROPHYLACTIC USE OF ANASTROZOLE (ARIMIDEX): ICD-10-CM

## 2018-02-05 PROCEDURE — 99214 OFFICE O/P EST MOD 30 MIN: CPT | Performed by: INTERNAL MEDICINE

## 2018-02-05 PROCEDURE — 99211 OFF/OP EST MAY X REQ PHY/QHP: CPT

## 2018-02-05 ASSESSMENT — ENCOUNTER SYMPTOMS
EYE DISCHARGE: 0
BLOOD IN STOOL: 0
FACIAL SWELLING: 0
NAUSEA: 0
COUGH: 0
SHORTNESS OF BREATH: 0
ABDOMINAL DISTENTION: 0
CHEST TIGHTNESS: 0
WHEEZING: 0
DIARRHEA: 0
BACK PAIN: 0
RECTAL PAIN: 0
TROUBLE SWALLOWING: 0
SORE THROAT: 0
CONSTIPATION: 0
COLOR CHANGE: 0
VOMITING: 0
ABDOMINAL PAIN: 0

## 2018-02-05 NOTE — PROGRESS NOTES
Name: Kamini Belcher  : 1955  MRN: [id number]    Oncology Navigation Follow-Up Note    Contact Type:  Medical Oncology    Education: Reviewed and discussed SCP in person with Nelia Singh prior to seeing Dr. Aparna Otero for check-up. Only complaint is hot flashes from Arimidex. Verbalizes understanding of all information and copy given to her. Notes: Encouraged patient to call if any questions or concerns.

## 2018-02-05 NOTE — PROGRESS NOTES
screen colonoscopy  02/02/2005    Zostavax vaccine  02/02/2015    Breast cancer screen  07/10/2018    Creatinine monitoring  07/24/2018    Potassium monitoring  07/27/2018    Flu vaccine  Completed        Subjective:   Review of Systems   Constitutional: Negative for activity change, appetite change, fatigue and fever. HENT: Negative for congestion, dental problem, facial swelling, hearing loss, mouth sores, nosebleeds, sore throat, tinnitus and trouble swallowing. Eyes: Negative for discharge and visual disturbance. Respiratory: Negative for cough, chest tightness, shortness of breath and wheezing. Cardiovascular: Negative for chest pain, palpitations and leg swelling. Gastrointestinal: Negative for abdominal distention, abdominal pain, blood in stool, constipation, diarrhea, nausea, rectal pain and vomiting. Endocrine: Negative for cold intolerance, polydipsia and polyuria. Genitourinary: Negative for decreased urine volume, difficulty urinating, dysuria, flank pain, hematuria and urgency. Musculoskeletal: Negative for arthralgias, back pain, gait problem, joint swelling, myalgias and neck stiffness. Skin: Negative for color change, rash and wound. Neurological: Negative for dizziness, tremors, seizures, speech difficulty, weakness, light-headedness, numbness and headaches. Hematological: Negative for adenopathy. Does not bruise/bleed easily. Psychiatric/Behavioral: Negative for confusion and sleep disturbance. The patient is not nervous/anxious. Objective:   Physical Exam   Constitutional: She is oriented to person, place, and time. She appears well-developed and well-nourished. No distress. HENT:   Head: Normocephalic. Mouth/Throat: Oropharynx is clear and moist. No oropharyngeal exudate. Eyes: EOM are normal. Pupils are equal, round, and reactive to light. Right eye exhibits no discharge. Left eye exhibits no discharge. No scleral icterus.    Neck: Normal range of

## 2018-03-08 ENCOUNTER — HOSPITAL ENCOUNTER (OUTPATIENT)
Dept: RADIATION ONCOLOGY | Age: 63
Discharge: HOME | End: 2018-03-08
Payer: COMMERCIAL

## 2018-03-08 DIAGNOSIS — Z17.0 MALIGNANT NEOPLASM OF LOWER-OUTER QUADRANT OF RIGHT BREAST OF FEMALE, ESTROGEN RECEPTOR POSITIVE (HCC): Primary | ICD-10-CM

## 2018-03-08 DIAGNOSIS — C50.511 MALIGNANT NEOPLASM OF LOWER-OUTER QUADRANT OF RIGHT BREAST OF FEMALE, ESTROGEN RECEPTOR POSITIVE (HCC): Primary | ICD-10-CM

## 2018-03-08 PROCEDURE — 99211 OFF/OP EST MAY X REQ PHY/QHP: CPT

## 2018-03-08 NOTE — PROGRESS NOTES
undergoing surgery and radiation therapy for breast cancer. Nella Kimball denies any changes in her health condition since her last visit. She denies complaints related to her radiation treatment at this time. Her biggest complaint is the hot flashes she is experiencing since starting Arimidex. She has discussed this with Dr. Thao Patino and she started her on evening primrose and vitamin Hernandomaribel Meyer states she thinks this has helped some with the hot flashes. She has no other complaints at this time. TESTS: None completed since the end of treatment. MEDICATIONS:   Current Outpatient Prescriptions   Medication Sig Dispense Refill    anastrozole (ARIMIDEX) 1 MG tablet Take 1 tablet by mouth daily 90 tablet 3    losartan (COZAAR) 100 MG tablet Take 100 mg by mouth daily      aspirin 81 MG chewable tablet Take 81 mg by mouth daily      Multiple Vitamins-Minerals (THERAPEUTIC MULTIVITAMIN-MINERALS) tablet Take 1 tablet by mouth daily      calcium carbonate 600 MG TABS tablet Take 1 tablet by mouth daily       No current facility-administered medications for this encounter. EXAMINATION:   GENERAL: Nella Kimball is a pleasant well-developed adult female. She is alert and oriented ×3 in no acute distress. VITAL SIGNS:  Weight 112.8 kg, temperature 36.6°C, pulse 85, blood pressure 141/70, respirations 16, pulse ox 100% on room air. HEENT: Normocephalic, atraumatic. PERRL. EOMI. Ears, nose and lips are within normal limits. Oropharynx is unremarkable  NECK: Without any adenopathy  LYMPH:  No supraclavicular or axillary adenopathy  LUNGS: Clear no adventitious sounds. HEART: Regular rate and rhythm without murmur  BREASTS: Left breast is soft, nontender. There are no suspicious masses, nodules or architectural distortions. Nipple is everted without discharge. Right breast is soft, slightly tender to palpation. Surgical incision sites are well-healed without signs of infection.   There is residual mild

## 2018-04-20 ENCOUNTER — HOSPITAL ENCOUNTER (OUTPATIENT)
Dept: WOMENS IMAGING | Age: 63
Discharge: HOME | End: 2018-04-20
Payer: COMMERCIAL

## 2018-04-20 DIAGNOSIS — Z17.0 MALIGNANT NEOPLASM OF LOWER-OUTER QUADRANT OF RIGHT BREAST OF FEMALE, ESTROGEN RECEPTOR POSITIVE (HCC): ICD-10-CM

## 2018-04-20 DIAGNOSIS — C50.511 MALIGNANT NEOPLASM OF LOWER-OUTER QUADRANT OF RIGHT BREAST OF FEMALE, ESTROGEN RECEPTOR POSITIVE (HCC): ICD-10-CM

## 2018-04-20 PROCEDURE — 77066 DX MAMMO INCL CAD BI: CPT

## 2018-07-06 ENCOUNTER — OFFICE VISIT (OUTPATIENT)
Dept: ONCOLOGY | Age: 63
End: 2018-07-06
Payer: COMMERCIAL

## 2018-07-06 ENCOUNTER — HOSPITAL ENCOUNTER (OUTPATIENT)
Dept: INFUSION THERAPY | Age: 63
Discharge: HOME | End: 2018-07-06
Payer: COMMERCIAL

## 2018-07-06 VITALS
RESPIRATION RATE: 18 BRPM | WEIGHT: 248.8 LBS | TEMPERATURE: 98.3 F | HEART RATE: 78 BPM | DIASTOLIC BLOOD PRESSURE: 84 MMHG | BODY MASS INDEX: 37.71 KG/M2 | SYSTOLIC BLOOD PRESSURE: 184 MMHG | OXYGEN SATURATION: 99 % | HEIGHT: 68 IN

## 2018-07-06 DIAGNOSIS — Z78.0 POST-MENOPAUSAL: Primary | ICD-10-CM

## 2018-07-06 DIAGNOSIS — Z98.890 S/P LUMPECTOMY, RIGHT BREAST: ICD-10-CM

## 2018-07-06 DIAGNOSIS — Z79.811 PROPHYLACTIC USE OF ANASTROZOLE (ARIMIDEX): ICD-10-CM

## 2018-07-06 PROCEDURE — 99214 OFFICE O/P EST MOD 30 MIN: CPT | Performed by: INTERNAL MEDICINE

## 2018-07-06 PROCEDURE — 99211 OFF/OP EST MAY X REQ PHY/QHP: CPT

## 2018-07-06 RX ORDER — MULTIVIT WITH MINERALS/LUTEIN
1000 TABLET ORAL DAILY
COMMUNITY
End: 2019-04-10 | Stop reason: ALTCHOICE

## 2018-07-06 ASSESSMENT — ENCOUNTER SYMPTOMS
ABDOMINAL DISTENTION: 0
DIARRHEA: 0
FACIAL SWELLING: 0
VOMITING: 0
BLOOD IN STOOL: 0
BACK PAIN: 0
ABDOMINAL PAIN: 0
RECTAL PAIN: 0
COUGH: 0
CHEST TIGHTNESS: 0
COLOR CHANGE: 0
CONSTIPATION: 0
SHORTNESS OF BREATH: 0
NAUSEA: 0
TROUBLE SWALLOWING: 0
SORE THROAT: 0
EYE DISCHARGE: 0
WHEEZING: 0

## 2018-07-06 NOTE — PROGRESS NOTES
 HIV screen  02/02/1970    DTaP/Tdap/Td vaccine (1 - Tdap) 02/02/1974    Lipid screen  02/02/1995    Shingles Vaccine (1 of 2 - 2 Dose Series) 02/02/2005    Colon cancer screen colonoscopy  02/02/2005    Creatinine monitoring  07/24/2018    Potassium monitoring  07/27/2018    Flu vaccine (1) 09/01/2018    Breast cancer screen  04/20/2019        Subjective:   Review of Systems   Constitutional: Negative for activity change, appetite change, fatigue and fever. HENT: Negative for congestion, dental problem, facial swelling, hearing loss, mouth sores, nosebleeds, sore throat, tinnitus and trouble swallowing. Eyes: Negative for discharge and visual disturbance. Respiratory: Negative for cough, chest tightness, shortness of breath and wheezing. Cardiovascular: Negative for chest pain, palpitations and leg swelling. Gastrointestinal: Negative for abdominal distention, abdominal pain, blood in stool, constipation, diarrhea, nausea, rectal pain and vomiting. Endocrine: Negative for cold intolerance, polydipsia and polyuria. Genitourinary: Negative for decreased urine volume, difficulty urinating, dysuria, flank pain, hematuria and urgency. Musculoskeletal: Negative for arthralgias, back pain, gait problem, joint swelling, myalgias and neck stiffness. Skin: Negative for color change, rash and wound. Neurological: Negative for dizziness, tremors, seizures, speech difficulty, weakness, light-headedness, numbness and headaches. Hematological: Negative for adenopathy. Does not bruise/bleed easily. Psychiatric/Behavioral: Negative for confusion and sleep disturbance. The patient is not nervous/anxious. Objective:   Physical Exam   Constitutional: She is oriented to person, place, and time. She appears well-developed and well-nourished. No distress. HENT:   Head: Normocephalic. Mouth/Throat: Oropharynx is clear and moist. No oropharyngeal exudate.    Eyes: EOM are normal. Pupils are risk disease. Status post adjuvant radiation treatment to the right breast.  Adjuvant hormonal therapy was Arimidex started in October 2017.  2.  Adjuvant hormonal therapy. The patient tolerates Arimidex well, no major side effects. Her physical examination is without evidence of disease recurrence. The patient will continue Arimidex. She had the DEXA study in November 2017 that showed normal bone density. Her mammogram in April 2018 showed benign findings. Diagnosis Orders   1. Post-menopausal     2. S/P lumpectomy and sln , right breast     3. Prophylactic use of anastrozole (Arimidex)          Plan:   Return in about 3 months (around 10/5/2018). Orders Placed:   No orders of the defined types were placed in this encounter. Medications Prescribed:   No orders of the defined types were placed in this encounter.

## 2018-09-24 ENCOUNTER — HOSPITAL ENCOUNTER (OUTPATIENT)
Dept: RADIATION ONCOLOGY | Age: 63
Discharge: HOME | End: 2018-09-24
Payer: COMMERCIAL

## 2018-09-24 DIAGNOSIS — Z85.3 HISTORY OF BREAST CANCER: Primary | ICD-10-CM

## 2018-09-26 PROCEDURE — 99211 OFF/OP EST MAY X REQ PHY/QHP: CPT

## 2018-09-26 NOTE — PROGRESS NOTES
[facility], [address]  Phone: [phone]   Toll Free: [phone]   Fax: [phone]    RADIATION ONCOLOGY FOLLOW UP REPORT    PATIENT NAME:  Joseph Whitehead     : 1955  MEDICAL RECORD NO: [id number]    LOCATION: [facility] NO: [id number]      PROVIDER: SAIMA Batista CNP        DATE OF SERVICE: 2018    FOLLOW UP PHYSICIANS: Dr. Tran Cordoba, Dr. Colette Mckeon     DIAGNOSIS: C50.511 -- well-differentiated invasive ductal carcinoma of the right lower outer quadrant female breast with associated grade 2 DCIS,pT2 N0 M0, Stage IIA, ER+, MT+, Her2-.     DATE OF DIAGNOSIS: 7/10/2017     END OF TREATMENT DATE: 10/10/2017     ECOG PERFORMANCE STATUS: 0     PAIN: Denies     CHAPERONE: Declined        HPI: Sean Zavala sought evaluation for palpable right breast nodule. She underwent diagnostic right mammogram and ultrasound on 2017.  The showed an irregular 1.5 cm mass in the right breast lower outer aspect posterior depth.  Biopsy confirmed the presence of well differentiated invasive ductal carcinoma as described above.  She discussed her treatment options during her surgical consultation and elected to undergo breast conservation surgery.  She underwent lumpectomy and sentinel lymph node biopsy 2017.  Final pathology showed a 2.1 cm invasive cancer with associated DCIS and clear surgical margins.  The sentinel lymph node was negative. Rocio Martell has been seen in hematology oncology and Oncotype DX score was 17 placing her in the low risk category.  She received a recommendation for radiation therapy after discussion regarding the role of radiation therapy in the management of her breast cancer she was agreeable to proceed with XRT.  Sean Zavala experienced a increase in fatigue, mild breast tenderness and moderate skin erythema during her treatment.      INTERVAL HISTORY:  Sean Zavala returns to [address] [place] [address] today for a post treatment check up after undergoing surgery and radiation therapy for breast cancer. Melyssa Hernandez denies any changes in her health condition since her last visit. She denies complaints related to her radiation treatment at this time. Trixie Berger states her hot flashes have improved since her last visit. States they are less frequent and not as intense as they were when she first started taking Arimidex. She is due to follow up with Dr. Collette Perone next month. TESTS:   4/20/18: Bilateral diagnostic mammogram:   IMPRESSION: Mammogram BI-RADS: 2: Benign       1. There is no mammographic evidence of malignancy. 2. A 1 year screening mammogram is recommended. MEDICATIONS:   Current Outpatient Prescriptions   Medication Sig Dispense Refill    EVENING PRIMROSE OIL PO Take 1,000 Units by mouth daily       vitamin E 1000 units capsule Take 1,000 Units by mouth daily      anastrozole (ARIMIDEX) 1 MG tablet Take 1 tablet by mouth daily 90 tablet 3    losartan (COZAAR) 100 MG tablet Take 100 mg by mouth daily      aspirin 81 MG chewable tablet Take 81 mg by mouth daily      Multiple Vitamins-Minerals (THERAPEUTIC MULTIVITAMIN-MINERALS) tablet Take 1 tablet by mouth daily      calcium carbonate 600 MG TABS tablet Take 1 tablet by mouth daily           ROS: As noted in the HPI and Interval history, otherwise negative. EXAMINATION:   GENERAL: Melyssa Hernandez is a pleasant well-developed adult female. Yamilet Valdovinos is alert and oriented ×3 in no acute distress. VITAL SIGNS:  Weight 100.4 kg, temperature 36.6°C, pulse 86, blood pressure 152/69, respirations 18, pulse ox 96% room air. HEENT: Normocephalic, atraumatic.  PERRL.  EOMI.  Ears, nose and lips are within normal limits.  Oropharynx is unremarkable  NECK: Without palpable adenopathy  LYMPH: Without palpable supraclavicular or axillary adenopathy  LUNGS: Clear no adventitious sounds. Respirations easy and unlabored.    HEART: Regular rate and rhythm without murmur  BREASTS: Left

## 2018-10-10 ENCOUNTER — OFFICE VISIT (OUTPATIENT)
Dept: ONCOLOGY | Age: 63
End: 2018-10-10
Payer: COMMERCIAL

## 2018-10-10 ENCOUNTER — HOSPITAL ENCOUNTER (OUTPATIENT)
Dept: INFUSION THERAPY | Age: 63
Discharge: HOME | End: 2018-10-10
Payer: COMMERCIAL

## 2018-10-10 VITALS
BODY MASS INDEX: 38.62 KG/M2 | RESPIRATION RATE: 12 BRPM | HEART RATE: 92 BPM | OXYGEN SATURATION: 97 % | SYSTOLIC BLOOD PRESSURE: 156 MMHG | DIASTOLIC BLOOD PRESSURE: 88 MMHG | HEIGHT: 68 IN | TEMPERATURE: 98.5 F | WEIGHT: 254.8 LBS

## 2018-10-10 DIAGNOSIS — Z17.0 MALIGNANT NEOPLASM OF LOWER-OUTER QUADRANT OF RIGHT BREAST OF FEMALE, ESTROGEN RECEPTOR POSITIVE (HCC): ICD-10-CM

## 2018-10-10 DIAGNOSIS — C50.511 MALIGNANT NEOPLASM OF LOWER-OUTER QUADRANT OF RIGHT BREAST OF FEMALE, ESTROGEN RECEPTOR POSITIVE (HCC): ICD-10-CM

## 2018-10-10 DIAGNOSIS — Z98.890 S/P LUMPECTOMY, RIGHT BREAST: Primary | ICD-10-CM

## 2018-10-10 DIAGNOSIS — Z79.811 PROPHYLACTIC USE OF ANASTROZOLE (ARIMIDEX): ICD-10-CM

## 2018-10-10 PROCEDURE — 99211 OFF/OP EST MAY X REQ PHY/QHP: CPT

## 2018-10-10 PROCEDURE — 99214 OFFICE O/P EST MOD 30 MIN: CPT | Performed by: INTERNAL MEDICINE

## 2018-10-10 RX ORDER — ANASTROZOLE 1 MG/1
1 TABLET ORAL DAILY
Qty: 90 TABLET | Refills: 3 | Status: SHIPPED | OUTPATIENT
Start: 2018-10-10 | End: 2018-10-25 | Stop reason: SDUPTHER

## 2018-10-10 ASSESSMENT — ENCOUNTER SYMPTOMS
SORE THROAT: 0
COUGH: 0
BLOOD IN STOOL: 0
ABDOMINAL PAIN: 0
VOMITING: 0
FACIAL SWELLING: 0
DIARRHEA: 0
NAUSEA: 0
SHORTNESS OF BREATH: 0
CHEST TIGHTNESS: 0
EYE DISCHARGE: 0
BACK PAIN: 0
TROUBLE SWALLOWING: 0
CONSTIPATION: 0
ABDOMINAL DISTENTION: 0
WHEEZING: 0
RECTAL PAIN: 0
COLOR CHANGE: 0

## 2018-10-10 NOTE — PROGRESS NOTES
[facility]X [facility] PROFESSIONAL SERVS  ONCOLOG[facility] SPECIALISTS OF [facility]  [address] [facility] [address]  [address] [facility] [address]  [address] [address]  Dept: [phone]  Dept Fax: [phone]: [phone]     Visit Date: 10/10/2018     Betty Chappell is a 61 y.o. female who presents today for:   Chief Complaint   Patient presents with    Follow-up     Breast CA        HPI:     This is a 43-year-old postmenopausal woman with stage II A right breast cancer   Her screening mammogram on July 6, 2017 showed 1.5 cm irregular mass in the right breast lower outer quadrant for which further evaluation with ultrasound was recommended, there was also irregular lymph node in the right breast posterior depth upper region. Bilateral breast ultrasound showed a 1.9 cm x 1.6 x 1.1 cm irregular solid mass in the right breast lower outer aspect with a lobulated lymph node with focal cortex thickening in the right axillary tail and, an intermediate suspicion for malignancy. On July 10, 2017 she underwent ultrasound guided needle core biopsy of the right breast mass and lymph node. Mass biopsy showed invasive ductal carcinoma of grade 1, ER strongly positive in 100% of cell staining,  progesterone receptor positive in 20% of cells staining and HER-2 not amplified. Needle core biopsy of the right axillary lymph node showed benign lymphoid tissue. Subsequently, she underwent lumpectomy with sentinel lymph node mapping and biopsy on July 27, 2017. Final pathology report showed:  A - Lymph node, sentinel, right axillary, excision:   Negative for malignancy. B-D - Breast, right mass, new cranial margin, and new deep margin,  excision:   Well differentiated invasive ductal carcinoma.   Jerrell score:  1 of 3   Tumor size:  2.1 cm   DCIS: Present, closely associated with invasive component, nuclear  grade II.   Margins: Negative for malignancy (closest margin is 0.5 mm, skin,      invasive component).   Pathologic stage:  PT2, (sn) pN0.     Oncotype DX assay  HIV screen  02/02/1970    DTaP/Tdap/Td vaccine (1 - Tdap) 02/02/1974    Lipid screen  02/02/1995    Shingles Vaccine (1 of 2 - 2 Dose Series) 02/02/2005    Colon cancer screen colonoscopy  02/02/2005    Creatinine monitoring  07/24/2018    Potassium monitoring  07/27/2018    Flu vaccine (1) 09/01/2018    Breast cancer screen  04/20/2019        Subjective:   Review of Systems   Constitutional: Negative for activity change, appetite change, fatigue and fever. HENT: Negative for congestion, dental problem, facial swelling, hearing loss, mouth sores, nosebleeds, sore throat, tinnitus and trouble swallowing. Eyes: Negative for discharge and visual disturbance. Respiratory: Negative for cough, chest tightness, shortness of breath and wheezing. Cardiovascular: Negative for chest pain, palpitations and leg swelling. Gastrointestinal: Negative for abdominal distention, abdominal pain, blood in stool, constipation, diarrhea, nausea, rectal pain and vomiting. Endocrine: Negative for cold intolerance, polydipsia and polyuria. Genitourinary: Negative for decreased urine volume, difficulty urinating, dysuria, flank pain, hematuria and urgency. Musculoskeletal: Negative for arthralgias, back pain, gait problem, joint swelling, myalgias and neck stiffness. Skin: Negative for color change, rash and wound. Neurological: Negative for dizziness, tremors, seizures, speech difficulty, weakness, light-headedness, numbness and headaches. Hematological: Negative for adenopathy. Does not bruise/bleed easily. Psychiatric/Behavioral: Negative for confusion and sleep disturbance. The patient is not nervous/anxious. Objective:   Physical Exam   Constitutional: She is oriented to person, place, and time. She appears well-developed and well-nourished. No distress. HENT:   Head: Normocephalic. Mouth/Throat: Oropharynx is clear and moist. No oropharyngeal exudate.    Eyes: Pupils are equal, round, and

## 2018-10-26 RX ORDER — ANASTROZOLE 1 MG/1
1 TABLET ORAL DAILY
Qty: 90 TABLET | Refills: 3 | Status: SHIPPED | OUTPATIENT
Start: 2018-10-26 | End: 2019-10-09 | Stop reason: SDUPTHER

## 2019-03-25 ENCOUNTER — HOSPITAL ENCOUNTER (OUTPATIENT)
Dept: RADIATION ONCOLOGY | Age: 64
Discharge: HOME | End: 2019-03-25
Payer: COMMERCIAL

## 2019-03-25 DIAGNOSIS — Z78.0 POSTMENOPAUSAL: Primary | ICD-10-CM

## 2019-03-25 DIAGNOSIS — Z85.3 HISTORY OF RIGHT BREAST CANCER: ICD-10-CM

## 2019-03-25 PROCEDURE — 99211 OFF/OP EST MAY X REQ PHY/QHP: CPT

## 2019-04-04 ENCOUNTER — HOSPITAL ENCOUNTER (OUTPATIENT)
Age: 64
Discharge: HOME | End: 2019-04-04
Payer: COMMERCIAL

## 2019-04-04 LAB
ANION GAP SERPL CALCULATED.3IONS-SCNC: 11 MEQ/L (ref 8–16)
BUN BLDV-MCNC: 16 MG/DL (ref 7–22)
CALCIUM SERPL-MCNC: 9.6 MG/DL (ref 8.5–10.5)
CHLORIDE BLD-SCNC: 104 MEQ/L (ref 98–111)
CO2: 26 MEQ/L (ref 23–33)
CREAT SERPL-MCNC: 0.8 MG/DL (ref 0.4–1.2)
EKG ATRIAL RATE: 75 BPM
EKG P AXIS: 42 DEGREES
EKG P-R INTERVAL: 148 MS
EKG Q-T INTERVAL: 384 MS
EKG QRS DURATION: 78 MS
EKG QTC CALCULATION (BAZETT): 428 MS
EKG R AXIS: 7 DEGREES
EKG T AXIS: 10 DEGREES
EKG VENTRICULAR RATE: 75 BPM
ERYTHROCYTE [DISTWIDTH] IN BLOOD BY AUTOMATED COUNT: 14 % (ref 11.5–14.5)
ERYTHROCYTE [DISTWIDTH] IN BLOOD BY AUTOMATED COUNT: 47.8 FL (ref 35–45)
GFR SERPL CREATININE-BSD FRML MDRD: 72 ML/MIN/1.73M2
GLUCOSE BLD-MCNC: 182 MG/DL (ref 70–108)
HCT VFR BLD CALC: 44.3 % (ref 37–47)
HEMOGLOBIN: 14.1 GM/DL (ref 12–16)
MCH RBC QN AUTO: 29.7 PG (ref 26–33)
MCHC RBC AUTO-ENTMCNC: 31.8 GM/DL (ref 32.2–35.5)
MCV RBC AUTO: 93.3 FL (ref 81–99)
PLATELET # BLD: 296 THOU/MM3 (ref 130–400)
PMV BLD AUTO: 10.1 FL (ref 9.4–12.4)
POTASSIUM SERPL-SCNC: 4.3 MEQ/L (ref 3.5–5.2)
RBC # BLD: 4.75 MILL/MM3 (ref 4.2–5.4)
SODIUM BLD-SCNC: 141 MEQ/L (ref 135–145)
WBC # BLD: 5.4 THOU/MM3 (ref 4.8–10.8)

## 2019-04-04 PROCEDURE — 80048 BASIC METABOLIC PNL TOTAL CA: CPT

## 2019-04-04 PROCEDURE — 36415 COLL VENOUS BLD VENIPUNCTURE: CPT

## 2019-04-04 PROCEDURE — 93005 ELECTROCARDIOGRAM TRACING: CPT | Performed by: PHYSICIAN ASSISTANT

## 2019-04-04 PROCEDURE — 85027 COMPLETE CBC AUTOMATED: CPT

## 2019-04-05 PROCEDURE — 93010 ELECTROCARDIOGRAM REPORT: CPT | Performed by: INTERNAL MEDICINE

## 2019-04-10 NOTE — PROGRESS NOTES
In preparation for their surgical procedure above patient was screened for Obstructive Sleep Apnea (FLASH) using the STOP-Bang Questionnaire by the Pre-Admission Testing department. This is a pre-surgical screening tool for patient safety and serves as a recommendation, this WILL NOT cause cancellation of surgery. STOP-Bang Questionnaire  * Do you currently see a pulmonologist?  No     If yes STOP, do not complete. Patient follows with Dr.     1.  Do you snore loudly (able to be heard in the next room)? No    2. Do you often feel tired or sleepy during the daytime? No       3. Has anyone ever told you that you stop breathing during your sleep? No    4. Do you have or are you being treated for high blood pressure? Yes      5. BMI more than 35? BMI (Calculated): 38.7        Yes    6. Age over 48 years? 59 y.o. Yes    7. Neck Circumference greater than 17 inches for male or 16 inches for female? Measured           (visits only)            Not Applicable    8. Gender Male? No      TOTAL SCORE: 3    FLASH - Low Risk : Yes to 0 - 2 questions  FLASH - Intermediate Risk : Yes to 3 - 4 questions  FLASH - High Risk : Yes to 5 - 8 questions    Adapted from:   STOP Questionnaire: A Tool to Screen Patients for Obstructive Sleep Apnea   BEN Cardozo.C.P.C., LONNIE Clark.B.B.S., Dasia Ivory M.D., Primitivo Mon[place]. Raffy Forrester, Ph.D., LONNIE Duarte.B.B.S., Atif Contreras M.Sc., Landy Blackmon M.D., [facility]. LUKAS HaileC.P.C.    Anesthesiology 2008; 542:023-92 Copyright 2008, the [address] of Anesthesiologists, [facility] 37.   ----------------------------------------------------------------------------------------------------------------

## 2019-04-17 ENCOUNTER — HOSPITAL ENCOUNTER (OUTPATIENT)
Age: 64
Setting detail: OUTPATIENT SURGERY
Discharge: STILL ADMITTED OR ADMINISTRATIVE | DRG: 310 | End: 2019-04-17
Attending: SPECIALIST | Admitting: SPECIALIST
Payer: COMMERCIAL

## 2019-04-17 ENCOUNTER — HOSPITAL ENCOUNTER (INPATIENT)
Age: 64
LOS: 1 days | Discharge: HOME | DRG: 310 | End: 2019-04-18
Attending: FAMILY MEDICINE | Admitting: INTERNAL MEDICINE
Payer: COMMERCIAL

## 2019-04-17 ENCOUNTER — APPOINTMENT (OUTPATIENT)
Dept: GENERAL RADIOLOGY | Age: 64
DRG: 310 | End: 2019-04-17
Payer: COMMERCIAL

## 2019-04-17 VITALS
SYSTOLIC BLOOD PRESSURE: 192 MMHG | BODY MASS INDEX: 37.04 KG/M2 | RESPIRATION RATE: 16 BRPM | DIASTOLIC BLOOD PRESSURE: 117 MMHG | TEMPERATURE: 98.4 F | HEIGHT: 68 IN | OXYGEN SATURATION: 99 % | HEART RATE: 169 BPM | WEIGHT: 244.4 LBS

## 2019-04-17 DIAGNOSIS — I48.91 ATRIAL FIBRILLATION WITH RAPID VENTRICULAR RESPONSE (HCC): Primary | ICD-10-CM

## 2019-04-17 DIAGNOSIS — E83.42 HYPOMAGNESEMIA: ICD-10-CM

## 2019-04-17 PROBLEM — I10 ESSENTIAL HYPERTENSION: Status: ACTIVE | Noted: 2019-04-17

## 2019-04-17 LAB
ANION GAP SERPL CALCULATED.3IONS-SCNC: 19 MEQ/L (ref 8–16)
BASOPHILS # BLD: 0.9 %
BASOPHILS ABSOLUTE: 0.1 THOU/MM3 (ref 0–0.1)
BUN BLDV-MCNC: 16 MG/DL (ref 7–22)
CALCIUM SERPL-MCNC: 10 MG/DL (ref 8.5–10.5)
CHLORIDE BLD-SCNC: 103 MEQ/L (ref 98–111)
CO2: 22 MEQ/L (ref 23–33)
CREAT SERPL-MCNC: 0.8 MG/DL (ref 0.4–1.2)
EKG ATRIAL RATE: 127 BPM
EKG ATRIAL RATE: 178 BPM
EKG ATRIAL RATE: 73 BPM
EKG P AXIS: 28 DEGREES
EKG P-R INTERVAL: 172 MS
EKG Q-T INTERVAL: 270 MS
EKG Q-T INTERVAL: 282 MS
EKG Q-T INTERVAL: 380 MS
EKG QRS DURATION: 68 MS
EKG QRS DURATION: 72 MS
EKG QRS DURATION: 82 MS
EKG QTC CALCULATION (BAZETT): 418 MS
EKG QTC CALCULATION (BAZETT): 444 MS
EKG QTC CALCULATION (BAZETT): 461 MS
EKG R AXIS: 10 DEGREES
EKG R AXIS: 52 DEGREES
EKG R AXIS: 70 DEGREES
EKG T AXIS: -42 DEGREES
EKG T AXIS: -48 DEGREES
EKG T AXIS: 1 DEGREES
EKG VENTRICULAR RATE: 161 BPM
EKG VENTRICULAR RATE: 163 BPM
EKG VENTRICULAR RATE: 73 BPM
EOSINOPHIL # BLD: 0.7 %
EOSINOPHILS ABSOLUTE: 0 THOU/MM3 (ref 0–0.4)
ERYTHROCYTE [DISTWIDTH] IN BLOOD BY AUTOMATED COUNT: 13.1 % (ref 11.5–14.5)
ERYTHROCYTE [DISTWIDTH] IN BLOOD BY AUTOMATED COUNT: 41.4 FL (ref 35–45)
GFR SERPL CREATININE-BSD FRML MDRD: 72 ML/MIN/1.73M2
GLUCOSE BLD-MCNC: 127 MG/DL (ref 70–108)
HCT VFR BLD CALC: 46.9 % (ref 37–47)
HEMOGLOBIN: 16.1 GM/DL (ref 12–16)
IMMATURE GRANS (ABS): 0.02 THOU/MM3 (ref 0–0.07)
IMMATURE GRANULOCYTES: 0.3 %
INR BLD: 1.05 (ref 0.85–1.13)
LYMPHOCYTES # BLD: 23.1 %
LYMPHOCYTES ABSOLUTE: 1.6 THOU/MM3 (ref 1–4.8)
MAGNESIUM: 2.1 MG/DL (ref 1.6–2.4)
MCH RBC QN AUTO: 30.1 PG (ref 26–33)
MCHC RBC AUTO-ENTMCNC: 34.3 GM/DL (ref 32.2–35.5)
MCV RBC AUTO: 87.7 FL (ref 81–99)
MONOCYTES # BLD: 7.7 %
MONOCYTES ABSOLUTE: 0.5 THOU/MM3 (ref 0.4–1.3)
NUCLEATED RED BLOOD CELLS: 0 /100 WBC
OSMOLALITY CALCULATION: 289.6 MOSMOL/KG (ref 275–300)
PLATELET # BLD: 316 THOU/MM3 (ref 130–400)
PMV BLD AUTO: 9.4 FL (ref 9.4–12.4)
POTASSIUM SERPL-SCNC: 4.1 MEQ/L (ref 3.5–5.2)
PRO-BNP: 30.2 PG/ML (ref 0–900)
RBC # BLD: 5.35 MILL/MM3 (ref 4.2–5.4)
SEG NEUTROPHILS: 67.3 %
SEGMENTED NEUTROPHILS ABSOLUTE COUNT: 4.6 THOU/MM3 (ref 1.8–7.7)
SODIUM BLD-SCNC: 144 MEQ/L (ref 135–145)
T4 FREE: 1.66 NG/DL (ref 0.93–1.76)
TROPONIN T: < 0.01 NG/ML
TROPONIN T: < 0.01 NG/ML
TSH SERPL DL<=0.05 MIU/L-ACNC: 3.15 UIU/ML (ref 0.4–4.2)
WBC # BLD: 6.8 THOU/MM3 (ref 4.8–10.8)

## 2019-04-17 PROCEDURE — 71046 X-RAY EXAM CHEST 2 VIEWS: CPT

## 2019-04-17 PROCEDURE — 2580000003 HC RX 258: Performed by: FAMILY MEDICINE

## 2019-04-17 PROCEDURE — 36415 COLL VENOUS BLD VENIPUNCTURE: CPT

## 2019-04-17 PROCEDURE — 93010 ELECTROCARDIOGRAM REPORT: CPT | Performed by: INTERNAL MEDICINE

## 2019-04-17 PROCEDURE — 85025 COMPLETE CBC W/AUTO DIFF WBC: CPT

## 2019-04-17 PROCEDURE — 96376 TX/PRO/DX INJ SAME DRUG ADON: CPT

## 2019-04-17 PROCEDURE — 93005 ELECTROCARDIOGRAM TRACING: CPT | Performed by: ANESTHESIOLOGY

## 2019-04-17 PROCEDURE — 84439 ASSAY OF FREE THYROXINE: CPT

## 2019-04-17 PROCEDURE — 84484 ASSAY OF TROPONIN QUANT: CPT

## 2019-04-17 PROCEDURE — 84443 ASSAY THYROID STIM HORMONE: CPT

## 2019-04-17 PROCEDURE — 93005 ELECTROCARDIOGRAM TRACING: CPT | Performed by: FAMILY MEDICINE

## 2019-04-17 PROCEDURE — 2500000003 HC RX 250 WO HCPCS: Performed by: FAMILY MEDICINE

## 2019-04-17 PROCEDURE — 6370000000 HC RX 637 (ALT 250 FOR IP): Performed by: FAMILY MEDICINE

## 2019-04-17 PROCEDURE — 2709999900 HC NON-CHARGEABLE SUPPLY

## 2019-04-17 PROCEDURE — 2580000003 HC RX 258: Performed by: INTERNAL MEDICINE

## 2019-04-17 PROCEDURE — 83735 ASSAY OF MAGNESIUM: CPT

## 2019-04-17 PROCEDURE — 85610 PROTHROMBIN TIME: CPT

## 2019-04-17 PROCEDURE — 6360000002 HC RX W HCPCS: Performed by: INTERNAL MEDICINE

## 2019-04-17 PROCEDURE — 80048 BASIC METABOLIC PNL TOTAL CA: CPT

## 2019-04-17 PROCEDURE — 99223 1ST HOSP IP/OBS HIGH 75: CPT | Performed by: INTERNAL MEDICINE

## 2019-04-17 PROCEDURE — 99285 EMERGENCY DEPT VISIT HI MDM: CPT

## 2019-04-17 PROCEDURE — 1200000003 HC TELEMETRY R&B

## 2019-04-17 PROCEDURE — 93005 ELECTROCARDIOGRAM TRACING: CPT | Performed by: INTERNAL MEDICINE

## 2019-04-17 PROCEDURE — 83880 ASSAY OF NATRIURETIC PEPTIDE: CPT

## 2019-04-17 PROCEDURE — 96365 THER/PROPH/DIAG IV INF INIT: CPT

## 2019-04-17 PROCEDURE — 6370000000 HC RX 637 (ALT 250 FOR IP): Performed by: INTERNAL MEDICINE

## 2019-04-17 RX ORDER — SODIUM CHLORIDE 0.9 % (FLUSH) 0.9 %
10 SYRINGE (ML) INJECTION EVERY 12 HOURS SCHEDULED
Status: DISCONTINUED | OUTPATIENT
Start: 2019-04-17 | End: 2019-04-18 | Stop reason: HOSPADM

## 2019-04-17 RX ORDER — FAMOTIDINE 20 MG/1
20 TABLET, FILM COATED ORAL 2 TIMES DAILY
Status: DISCONTINUED | OUTPATIENT
Start: 2019-04-17 | End: 2019-04-18 | Stop reason: HOSPADM

## 2019-04-17 RX ORDER — DILTIAZEM HYDROCHLORIDE 5 MG/ML
10 INJECTION INTRAVENOUS ONCE
Status: COMPLETED | OUTPATIENT
Start: 2019-04-17 | End: 2019-04-17

## 2019-04-17 RX ORDER — ONDANSETRON 2 MG/ML
4 INJECTION INTRAMUSCULAR; INTRAVENOUS EVERY 6 HOURS PRN
Status: DISCONTINUED | OUTPATIENT
Start: 2019-04-17 | End: 2019-04-18 | Stop reason: HOSPADM

## 2019-04-17 RX ORDER — POLYETHYLENE GLYCOL 3350 17 G/17G
17 POWDER, FOR SOLUTION ORAL DAILY
Status: DISCONTINUED | OUTPATIENT
Start: 2019-04-17 | End: 2019-04-18 | Stop reason: HOSPADM

## 2019-04-17 RX ORDER — HEPARIN SODIUM 1000 [USP'U]/ML
80 INJECTION, SOLUTION INTRAVENOUS; SUBCUTANEOUS ONCE
Status: COMPLETED | OUTPATIENT
Start: 2019-04-17 | End: 2019-04-17

## 2019-04-17 RX ORDER — HEPARIN SODIUM 10000 [USP'U]/100ML
18 INJECTION, SOLUTION INTRAVENOUS CONTINUOUS
Status: DISCONTINUED | OUTPATIENT
Start: 2019-04-17 | End: 2019-04-18

## 2019-04-17 RX ORDER — SODIUM CHLORIDE 9 MG/ML
INJECTION, SOLUTION INTRAVENOUS CONTINUOUS
Status: DISCONTINUED | OUTPATIENT
Start: 2019-04-17 | End: 2019-04-18 | Stop reason: HOSPADM

## 2019-04-17 RX ORDER — HEPARIN SODIUM 1000 [USP'U]/ML
30 INJECTION, SOLUTION INTRAVENOUS; SUBCUTANEOUS PRN
Status: DISCONTINUED | OUTPATIENT
Start: 2019-04-17 | End: 2019-04-17 | Stop reason: DRUGHIGH

## 2019-04-17 RX ORDER — HEPARIN SODIUM 1000 [USP'U]/ML
80 INJECTION, SOLUTION INTRAVENOUS; SUBCUTANEOUS PRN
Status: DISCONTINUED | OUTPATIENT
Start: 2019-04-17 | End: 2019-04-18

## 2019-04-17 RX ORDER — SODIUM CHLORIDE 9 MG/ML
INJECTION, SOLUTION INTRAVENOUS CONTINUOUS
Status: DISCONTINUED | OUTPATIENT
Start: 2019-04-17 | End: 2019-04-17 | Stop reason: HOSPADM

## 2019-04-17 RX ORDER — HEPARIN SODIUM 1000 [USP'U]/ML
60 INJECTION, SOLUTION INTRAVENOUS; SUBCUTANEOUS ONCE
Status: DISCONTINUED | OUTPATIENT
Start: 2019-04-17 | End: 2019-04-17 | Stop reason: DRUGHIGH

## 2019-04-17 RX ORDER — IBUPROFEN 800 MG/1
TABLET ORAL
Status: DISCONTINUED
Start: 2019-04-17 | End: 2019-04-17

## 2019-04-17 RX ORDER — LOSARTAN POTASSIUM 100 MG/1
100 TABLET ORAL DAILY
Status: DISCONTINUED | OUTPATIENT
Start: 2019-04-18 | End: 2019-04-18 | Stop reason: HOSPADM

## 2019-04-17 RX ORDER — HEPARIN SODIUM 1000 [USP'U]/ML
40 INJECTION, SOLUTION INTRAVENOUS; SUBCUTANEOUS PRN
Status: DISCONTINUED | OUTPATIENT
Start: 2019-04-17 | End: 2019-04-18

## 2019-04-17 RX ORDER — IBUPROFEN 800 MG/1
800 TABLET ORAL ONCE
Status: COMPLETED | OUTPATIENT
Start: 2019-04-17 | End: 2019-04-17

## 2019-04-17 RX ORDER — HEPARIN SODIUM 10000 [USP'U]/100ML
12 INJECTION, SOLUTION INTRAVENOUS CONTINUOUS
Status: DISCONTINUED | OUTPATIENT
Start: 2019-04-17 | End: 2019-04-17 | Stop reason: DRUGHIGH

## 2019-04-17 RX ORDER — ACETAMINOPHEN 325 MG/1
650 TABLET ORAL EVERY 4 HOURS PRN
Status: DISCONTINUED | OUTPATIENT
Start: 2019-04-17 | End: 2019-04-18 | Stop reason: HOSPADM

## 2019-04-17 RX ORDER — CEFAZOLIN SODIUM 1 G/50ML
1 INJECTION, SOLUTION INTRAVENOUS ONCE
Status: DISCONTINUED | OUTPATIENT
Start: 2019-04-17 | End: 2019-04-17 | Stop reason: HOSPADM

## 2019-04-17 RX ORDER — ASPIRIN 81 MG/1
81 TABLET, CHEWABLE ORAL DAILY
Status: DISCONTINUED | OUTPATIENT
Start: 2019-04-17 | End: 2019-04-18 | Stop reason: HOSPADM

## 2019-04-17 RX ORDER — HYDROXYZINE HYDROCHLORIDE 50 MG/ML
50 INJECTION, SOLUTION INTRAMUSCULAR ONCE
Status: DISCONTINUED | OUTPATIENT
Start: 2019-04-17 | End: 2019-04-17

## 2019-04-17 RX ORDER — HEPARIN SODIUM 1000 [USP'U]/ML
60 INJECTION, SOLUTION INTRAVENOUS; SUBCUTANEOUS PRN
Status: DISCONTINUED | OUTPATIENT
Start: 2019-04-17 | End: 2019-04-17 | Stop reason: DRUGHIGH

## 2019-04-17 RX ORDER — SODIUM CHLORIDE 0.9 % (FLUSH) 0.9 %
10 SYRINGE (ML) INJECTION PRN
Status: DISCONTINUED | OUTPATIENT
Start: 2019-04-17 | End: 2019-04-18 | Stop reason: HOSPADM

## 2019-04-17 RX ORDER — ANASTROZOLE 1 MG/1
1 TABLET ORAL DAILY
Status: DISCONTINUED | OUTPATIENT
Start: 2019-04-17 | End: 2019-04-18 | Stop reason: HOSPADM

## 2019-04-17 RX ADMIN — DILTIAZEM HYDROCHLORIDE 5 MG/HR: 5 INJECTION INTRAVENOUS at 14:10

## 2019-04-17 RX ADMIN — IBUPROFEN 800 MG: 800 TABLET, FILM COATED ORAL at 14:50

## 2019-04-17 RX ADMIN — DILTIAZEM HYDROCHLORIDE 10 MG: 5 INJECTION INTRAVENOUS at 13:45

## 2019-04-17 RX ADMIN — SODIUM CHLORIDE: 9 INJECTION, SOLUTION INTRAVENOUS at 17:19

## 2019-04-17 RX ADMIN — HEPARIN SODIUM 18 UNITS/KG/HR: 10000 INJECTION, SOLUTION INTRAVENOUS at 17:25

## 2019-04-17 RX ADMIN — HEPARIN SODIUM 8840 UNITS: 1000 INJECTION INTRAVENOUS; SUBCUTANEOUS at 17:27

## 2019-04-17 RX ADMIN — FAMOTIDINE 20 MG: 20 TABLET ORAL at 21:42

## 2019-04-17 ASSESSMENT — ENCOUNTER SYMPTOMS
COUGH: 0
ABDOMINAL PAIN: 0
VOMITING: 0
WHEEZING: 0
RHINORRHEA: 0
DIARRHEA: 0
SORE THROAT: 0
EYE PAIN: 0
BACK PAIN: 0
SHORTNESS OF BREATH: 0
NAUSEA: 0
EYE DISCHARGE: 0

## 2019-04-17 ASSESSMENT — PAIN SCALES - GENERAL
PAINLEVEL_OUTOF10: 0
PAINLEVEL_OUTOF10: 6
PAINLEVEL_OUTOF10: 0

## 2019-04-17 ASSESSMENT — PAIN - FUNCTIONAL ASSESSMENT: PAIN_FUNCTIONAL_ASSESSMENT: 0-10

## 2019-04-17 NOTE — ED TRIAGE NOTES
Pt to ed sent from the [address] [place] [address] for new onset A-fib. Pt reports that she was there to have a cyst removed from her scalp and the staff discovered the A-fib. Pt reports no pain reports that she just feels anxious. ekg obtained and pt placed on the cardiac monitor. Lung sounds clear, regular and unlabored. Dr. Suad Arechiga at the bedside to assess pt and discuss the POC. Iv established and blood work obtained. Will monitor for further orders. Family remains at the bedside and call light in reach.

## 2019-04-17 NOTE — ED NOTES
Alaina RN admission nurse at the bedside to obtain information for admission. Pt medicated per order for A-fib and informed that infusion would be initiated once arrived from pharmacy. Pt reports after receiving medication that her anxious feeling is decreasing and reports that she remains pain free. Radiology into transport pt for further testing. Will monitor for pt's return. Family remains in room waiting for pt's return.      Kishore Culp RN  04/17/19 [id number]

## 2019-04-17 NOTE — ED NOTES
Spoke to erin and let her know that the patient is on the way to the floor at this time to 3b37.      Marco Lock, PEARLN  11/72/37 Kristin 108, LPN  21/10/34 [id number]

## 2019-04-17 NOTE — PROGRESS NOTES
1626  Patient arrived per cart to 3B. Heart monitor applied and vitals taken. Admission paperwork completed. Explained to patient that [facility] is not responsible for any lost or stolen items. Patient verbalized understanding. Oriented to room and use of call light and bed controls. Patient denies pain or needs. No signs of distress noted. Bed locked & in low position, side-rails up x2. Call light in reach. Reminded patient to call nurse if any needs arise.

## 2019-04-17 NOTE — ED PROVIDER NOTES
[facility]  eMERGENCY dEPARTMENT eNCOUnter          CHIEF COMPLAINT       Chief Complaint   Patient presents with    Atrial Fibrillation       Nurses Notes reviewed and I agree except as noted in the HPI. HISTORY OF PRESENT ILLNESS    Joi Atkins is a 59 y.o. female who presents to the Emergency Department with a medical history of HTN for the evaluation of new onset atrial fibrillation. The patient states that she was at the [facility] this afternoon in order to have a cyst removed from her scalp when it was noticed that the patient was in afib with rvr causing her to be sent to the ED for evaluation. Patient states that she is currently experiencing increased anxiety and palpitations associated with her afib. Patient denies any headache, chest pain, shortness of breath, nausea, vomiting, or diarrhea. She denies any known medical history of Afib, CHF, or CAD. The patient reports no additional symptoms or complaints at this time. The HPI was provided by the patient. REVIEW OF SYSTEMS     Review of Systems   Constitutional: Negative for appetite change, chills, fatigue and fever. HENT: Negative for congestion, ear pain, rhinorrhea and sore throat. Eyes: Negative for pain, discharge and visual disturbance. Respiratory: Negative for cough, shortness of breath and wheezing. Cardiovascular: Positive for palpitations. Negative for chest pain and leg swelling. Gastrointestinal: Negative for abdominal pain, diarrhea, nausea and vomiting. Genitourinary: Negative for difficulty urinating, dysuria, hematuria and vaginal discharge. Musculoskeletal: Negative for arthralgias, back pain, joint swelling and neck pain. Skin: Negative for pallor and rash. Neurological: Negative for dizziness, syncope, weakness, light-headedness, numbness and headaches. Hematological: Negative for adenopathy. Psychiatric/Behavioral: Negative for confusion and suicidal ideas.  The patient is nervous/anxious. PAST MEDICAL HISTORY    has a past medical history of Cancer ([facility] [address] Hypertension, and Rosacea. SURGICAL HISTORY      has a past surgical history that includes Breast biopsy (Right, 07/10/2017); Hysterectomy, total abdominal (); Knee cartilage surgery (Right, ); and Breast lumpectomy (Right, 2017). CURRENT MEDICATIONS       Current Discharge Medication List      CONTINUE these medications which have NOT CHANGED    Details   anastrozole (ARIMIDEX) 1 MG tablet Take 1 tablet by mouth daily  Qty: 90 tablet, Refills: 3      losartan (COZAAR) 100 MG tablet Take 100 mg by mouth daily      aspirin 81 MG chewable tablet Take 81 mg by mouth daily      Multiple Vitamins-Minerals (THERAPEUTIC MULTIVITAMIN-MINERALS) tablet Take 1 tablet by mouth daily      calcium carbonate 600 MG TABS tablet Take 1 tablet by mouth daily             ALLERGIES     is allergic to tape [adhesive tape]. FAMILY HISTORY      indicated that her mother is alive. She indicated that her father is . She indicated that the status of her neg hx is unknown.   family history includes Heart Disease in her father; High Blood Pressure in her mother. SOCIAL HISTORY      reports that she has never smoked. She has never used smokeless tobacco. She reports that she drinks alcohol. She reports that she does not use drugs. PHYSICAL EXAM     INITIAL VITALS:  height is 5' 8\" (1.727 m) and weight is 247 lb 5.7 oz (112.2 kg). Her oral temperature is 98.2 °F (36.8 °C). Her blood pressure is 137/63 and her pulse is 74. Her respiration is 16 and oxygen saturation is 97%. Physical Exam   Constitutional: She is oriented to person, place, and time. She appears well-developed and well-nourished. Non-toxic appearance. HENT:   Head: Normocephalic and atraumatic.    Right Ear: Tympanic membrane and external ear normal.   Left Ear: Tympanic membrane and external ear normal.   Nose: Nose normal.   Mouth/Throat: Oropharynx is clear and moist and mucous membranes are normal. No oropharyngeal exudate, posterior oropharyngeal edema or posterior oropharyngeal erythema. Eyes: Conjunctivae and EOM are normal.   Neck: Normal range of motion. Neck supple. No JVD present. Cardiovascular: Normal heart sounds, intact distal pulses and normal pulses. An irregularly irregular rhythm present. Tachycardia present. Exam reveals no gallop and no friction rub. No murmur heard. Pulmonary/Chest: Effort normal and breath sounds normal. No respiratory distress. She has no decreased breath sounds. She has no wheezes. She has no rhonchi. She has no rales. Abdominal: Soft. Bowel sounds are normal. She exhibits no distension. There is no tenderness. There is no rebound, no guarding and no CVA tenderness. Musculoskeletal: Normal range of motion. She exhibits no edema. Neurological: She is alert and oriented to person, place, and time. She exhibits normal muscle tone. Coordination normal.   Skin: Skin is warm and dry. No rash noted. She is not diaphoretic. Nursing note and vitals reviewed. DIFFERENTIALDIAGNOSIS:   Include and are not limited to: new onset afib, underlying cardiac arrhythmia, ACS, CHF    DIAGNOSTICRESULTS     EKG: All EKG's are interpreted by the Emergency Department Physician who either signs or Co-signs this chart in the absence of acardiologist.  EKG interpreted by Cara Regan MD:    Vent. Rate: 163 bpm  DE interval: * ms  QRS duration: 72 ms  QTc: 444 ms  P-R-T axes: *, 52, -42  No STEMI. EKG gives impression of Afib with RVR    Compared to old EKG on 17-Apr-2019    RADIOLOGY: non-plain film images(s) such as CT, Ultrasound and MRI are read by the radiologist.    XR CHEST STANDARD (2 VW)   Final Result      No acute findings. **This report has been created using voice recognition software. It may contain minor errors which are inherent in voice recognition technology. **      Final report electronically signed evaluated within the ED today for the evaluation due to new onset afib. The patient presented in no acute distress. The patient was anxious during my initial evaluation and presented in Afib with RVR due to a heart rate in the 160's. BNP is normal. Troponin is negative. Mg is 2.1. Portable chest XR reveals no acute findings. EKG reveals Afib with RVR. The patient was treated with diltiazem while in the ED due to her presenting condition. I observed the patient's condition to remain stable during the duration of their stay. Due to the patient's new onset afib and work up I recommended admission and she was amenable to my decision. The case was discussed with Dr. Tosin Zuñiga (internal medicine) who graciously accepts the patient for admission and further evaluation. CRITICAL CARE:   There was a high probability of clinically significant/life threatening deterioration in this patient's condition which required my urgent intervention. Total critical care time was 30 minutes. This excludes any time for separately reportable procedures. CONSULTS:  Dr. Tosin Zuñiga (internal medicine) - graciously accepts the patient for admission and further evaluation/work-up. PROCEDURES:  None     FINAL IMPRESSION      1. Atrial fibrillation with rapid ventricular response ([address]    2.  Hypomagnesemia          DISPOSITION/PLAN   Admission    PATIENT REFERRED TO:  Janie Carter MD  [address] [facility] [address] [place] [phone]    Schedule an appointment as soon as possible for a visit in 1 week      Kathren Meigs, MD  [place] [id number] [facility],[address]  [address] [place] [address]    Go on 6/5/2019  10:30 arrive 15 min early, Id , insurance card, and list of medications     Julieth Clarke MD  [facility] [address]  [phone]    Go on 5/6/2019  9:15 am      DISCHARGE MEDICATIONS:  Current Discharge Medication List      START taking these medications    Details   apixaban (ELIQUIS) 5 MG TABS tablet Take 1 tablet by mouth 2 times daily  Qty: 60 tablet, Refills: 0      metoprolol tartrate (LOPRESSOR) 25 MG tablet Take 1 tablet by mouth 2 times daily  Qty: 60 tablet, Refills: 0      magnesium oxide (MAG-OX) 400 (241.3 Mg) MG TABS tablet Take 1 tablet by mouth daily  Qty: 10 tablet, Refills: 0             (Please note that portions of this note were completed with a voice recognition program.  Efforts weremade to edit the dictations but occasionally words are mis-transcribed.)    Scribe:  Susana Desai 4/17/19 1:31 PM Scribing for and in thepresence of Beryle Margo, MD.    Signed by: Zita Akins, 04/18/19 7:38 PM    Provider:  I personally performed the services described in the documentation, reviewed and edited the documentation which was dictated to the scribe in my presence, and it accurately records my words andactions.     Beryle Margo, MD 4/17/19 7:38 PM       Beryle Margo, MD  04/18/19 [phone]

## 2019-04-17 NOTE — H&P
History & Physical        Patient:  Flash Montiel  YOB: 1955    MRN: [id number]     Acct: [de-identified]    PCP: Lainey Child MD    Date of Admission: 4/17/2019    Date of Service: Pt seen/examined on 4/17/2019 and Admitted to Inpatient with expected LOS greater than two midnights due to medical therapy. Chief Complaint:    Chief Complaint   Patient presents with    Atrial Fibrillation         History Of Present Illness:      59 y.o. female who presented to [facility] with \"evaluation of new onset atrial fibrillation. The patient states that she was at the [facility] this afternoon in order to have a cyst removed from her scalp when it was noticed that the patient was in afib with rvr causing her to be sent to the ED for evaluation. Patient states that she is currently experiencing increased anxiety and palpitations associated with her afib. Patient denies any headache, chest pain, shortness of breath, nausea, vomiting, or diarrhea. She denies any known medical history of Afib, CHF, or CAD. The patient reports no additional symptoms or complaints at this time. \"-per er note and confirmed by myself. Addendum: patient wants Dr Adria Jimenez as her cardiologist, he takes care of her . Past Medical History:          Diagnosis Date    Cancer [facility])     breast    Hypertension     Rosacea     face       Past Surgical History:          Procedure Laterality Date    BREAST BIOPSY Right 07/10/2017    women's wellness     BREAST LUMPECTOMY Right 7/27/2017    RIGHT LUMPECTOMY AND SENTINEL LYMPH NODE BIOPSY performed by Alex Mims MD at [address], [facility] SURGERY Right 2014       Medications Prior to Admission:      Prior to Admission medications    Medication Sig Start Date End Date Taking?  Authorizing Provider   anastrozole (ARIMIDEX) 1 MG tablet Take 1 tablet by mouth daily 10/26/18  Yes Wava Apley, MD   losartan rebound. Musculoskeletal:  No clubbing, cyanosis or edema bilaterally. Full range of motion without deformity. Skin: Skin color, texture, turgor normal.  No rashes or lesions, or suspicious lesions. Neurologic:  Neurovascularly intact without any focal sensory/motor deficits. Cranial nerves: II-XII intact, grossly non-focal.  Psychiatric:  Alert and oriented, thought content appropriate, normal insight  Capillary Refill: Brisk,< 2 seconds   Peripheral Pulses: +2 palpable, equal bilaterally upper and lower extremities  Lymphatics: no lymphadenopathy      Labs:     Recent Labs     04/17/19  1335   WBC 6.8   HGB 16.1*   HCT 46.9        Recent Labs     04/17/19  1335      K 4.1      CO2 22*   BUN 16   CREATININE 0.8   CALCIUM 10.0     No results for input(s): AST, ALT, BILIDIR, BILITOT, ALKPHOS in the last 72 hours. Recent Labs     04/17/19  1335   INR 1.05     No results for input(s): [employer] in the last 72 hours. Urinalysis:    No results found for: Taina Civil, BACTERIA, [address] [facility], BLOODU, [address], [facility] [address]    Radiology:     CXR: I have reviewed the CXR with the following interpretation: -ve  EKG:  I have reviewed the EKG with the following interpretation: afib with rvr    Xr Chest Standard (2 Vw)    Result Date: 4/17/2019  PROCEDURE: XR CHEST (2 VW) CLINICAL INFORMATION: palpitations, new onset Afib. COMPARISON: No prior study. TECHNIQUE: PA and lateral views the chest. FINDINGS: There is no lobar consolidation. Costophrenic recesses are preserved. Cardiac mediastinal silhouette is within normal limits. Atherosclerotic changes aortic arch. Postsurgical changes right lateral chest wall. No acute osseous findings. No acute findings. **This report has been created using voice recognition software. It may contain minor errors which are inherent in voice recognition technology. ** Final report electronically signed by Dr. Shelbi Boucher on 4/17/2019 2:11 PM           DVT prophylaxis: [] Lovenox                                 [] SCDs                                 [] SQ Heparin                                 [] Encourage ambulation           [x] Already on Anticoagulation    Code Status: Prior      PT/OT Eval Status:     Disposition:    [x] Home       [] TCU       [] Rehab       [] Psych       [] SNF       [] [place]       [] Other-    ASSESSMENT:    Active Hospital Problems    Diagnosis Date Noted    New onset atrial fibrillation ([facility] [address] [I48.91] 04/17/2019    Essential hypertension [I10] 04/17/2019       PLAN:    1. Admit  2. Cardiology consulted  3. cardizem gtt  4. Heparin gtt  5. Resume home meds  6. Echo ordered  7. Thank you Subhash Godoy MD for the opportunity to be involved in this patient's care.     Electronically signed by Aleena Sidhu DO on 4/17/2019 at 3:34 PM

## 2019-04-18 VITALS
BODY MASS INDEX: 37.49 KG/M2 | WEIGHT: 247.36 LBS | HEART RATE: 74 BPM | OXYGEN SATURATION: 97 % | TEMPERATURE: 98.2 F | DIASTOLIC BLOOD PRESSURE: 63 MMHG | RESPIRATION RATE: 16 BRPM | HEIGHT: 68 IN | SYSTOLIC BLOOD PRESSURE: 137 MMHG

## 2019-04-18 LAB
ANION GAP SERPL CALCULATED.3IONS-SCNC: 13 MEQ/L (ref 8–16)
APTT: 127.3 SECONDS (ref 22–38)
APTT: 193.7 SECONDS (ref 22–38)
BASOPHILS # BLD: 1.1 %
BASOPHILS ABSOLUTE: 0.1 THOU/MM3 (ref 0–0.1)
BUN BLDV-MCNC: 15 MG/DL (ref 7–22)
CALCIUM SERPL-MCNC: 9.1 MG/DL (ref 8.5–10.5)
CHLORIDE BLD-SCNC: 104 MEQ/L (ref 98–111)
CO2: 22 MEQ/L (ref 23–33)
CREAT SERPL-MCNC: 0.9 MG/DL (ref 0.4–1.2)
EOSINOPHIL # BLD: 2.1 %
EOSINOPHILS ABSOLUTE: 0.1 THOU/MM3 (ref 0–0.4)
ERYTHROCYTE [DISTWIDTH] IN BLOOD BY AUTOMATED COUNT: 13.3 % (ref 11.5–14.5)
ERYTHROCYTE [DISTWIDTH] IN BLOOD BY AUTOMATED COUNT: 43.2 FL (ref 35–45)
GFR SERPL CREATININE-BSD FRML MDRD: 63 ML/MIN/1.73M2
GLUCOSE BLD-MCNC: 126 MG/DL (ref 70–108)
HCT VFR BLD CALC: 41.7 % (ref 37–47)
HEMOGLOBIN: 13.8 GM/DL (ref 12–16)
IMMATURE GRANS (ABS): 0.02 THOU/MM3 (ref 0–0.07)
IMMATURE GRANULOCYTES: 0.3 %
LV EF: 58 %
LVEF MODALITY: NORMAL
LYMPHOCYTES # BLD: 38.9 %
LYMPHOCYTES ABSOLUTE: 2.5 THOU/MM3 (ref 1–4.8)
MAGNESIUM: 1.9 MG/DL (ref 1.6–2.4)
MCH RBC QN AUTO: 29.7 PG (ref 26–33)
MCHC RBC AUTO-ENTMCNC: 33.1 GM/DL (ref 32.2–35.5)
MCV RBC AUTO: 89.7 FL (ref 81–99)
MONOCYTES # BLD: 9.3 %
MONOCYTES ABSOLUTE: 0.6 THOU/MM3 (ref 0.4–1.3)
NUCLEATED RED BLOOD CELLS: 0 /100 WBC
PLATELET # BLD: 270 THOU/MM3 (ref 130–400)
PMV BLD AUTO: 9.5 FL (ref 9.4–12.4)
POTASSIUM REFLEX MAGNESIUM: 4 MEQ/L (ref 3.5–5.2)
RBC # BLD: 4.65 MILL/MM3 (ref 4.2–5.4)
SEG NEUTROPHILS: 48.3 %
SEGMENTED NEUTROPHILS ABSOLUTE COUNT: 3 THOU/MM3 (ref 1.8–7.7)
SODIUM BLD-SCNC: 139 MEQ/L (ref 135–145)
TROPONIN T: < 0.01 NG/ML
WBC # BLD: 6.3 THOU/MM3 (ref 4.8–10.8)

## 2019-04-18 PROCEDURE — 6370000000 HC RX 637 (ALT 250 FOR IP): Performed by: INTERNAL MEDICINE

## 2019-04-18 PROCEDURE — 93306 TTE W/DOPPLER COMPLETE: CPT

## 2019-04-18 PROCEDURE — 80048 BASIC METABOLIC PNL TOTAL CA: CPT

## 2019-04-18 PROCEDURE — 36415 COLL VENOUS BLD VENIPUNCTURE: CPT

## 2019-04-18 PROCEDURE — 83735 ASSAY OF MAGNESIUM: CPT

## 2019-04-18 PROCEDURE — 6370000000 HC RX 637 (ALT 250 FOR IP): Performed by: FAMILY MEDICINE

## 2019-04-18 PROCEDURE — 99239 HOSP IP/OBS DSCHRG MGMT >30: CPT | Performed by: FAMILY MEDICINE

## 2019-04-18 PROCEDURE — 84484 ASSAY OF TROPONIN QUANT: CPT

## 2019-04-18 PROCEDURE — 6360000002 HC RX W HCPCS: Performed by: INTERNAL MEDICINE

## 2019-04-18 PROCEDURE — 85025 COMPLETE CBC W/AUTO DIFF WBC: CPT

## 2019-04-18 PROCEDURE — 85730 THROMBOPLASTIN TIME PARTIAL: CPT

## 2019-04-18 PROCEDURE — 99223 1ST HOSP IP/OBS HIGH 75: CPT | Performed by: INTERNAL MEDICINE

## 2019-04-18 RX ADMIN — HEPARIN SODIUM 14 UNITS/KG/HR: 10000 INJECTION, SOLUTION INTRAVENOUS at 09:57

## 2019-04-18 RX ADMIN — METOPROLOL TARTRATE 25 MG: 25 TABLET, FILM COATED ORAL at 19:43

## 2019-04-18 RX ADMIN — ASPIRIN 81 MG 81 MG: 81 TABLET ORAL at 08:51

## 2019-04-18 RX ADMIN — MAGNESIUM GLUCONATE 500 MG ORAL TABLET 400 MG: 500 TABLET ORAL at 11:33

## 2019-04-18 RX ADMIN — METOPROLOL TARTRATE 25 MG: 25 TABLET, FILM COATED ORAL at 11:35

## 2019-04-18 RX ADMIN — APIXABAN 5 MG: 5 TABLET, FILM COATED ORAL at 11:33

## 2019-04-18 RX ADMIN — LOSARTAN POTASSIUM 100 MG: 100 TABLET, FILM COATED ORAL at 08:51

## 2019-04-18 RX ADMIN — FAMOTIDINE 20 MG: 20 TABLET ORAL at 08:51

## 2019-04-18 RX ADMIN — APIXABAN 5 MG: 5 TABLET, FILM COATED ORAL at 19:43

## 2019-04-18 RX ADMIN — ANASTROZOLE 1 MG: 1 TABLET ORAL at 08:51

## 2019-04-18 ASSESSMENT — PAIN SCALES - GENERAL
PAINLEVEL_OUTOF10: 0

## 2019-04-18 NOTE — PROGRESS NOTES
Dr. Adria Jimenez on floor. Stated to stop cardizem gtt,. Start patient on metoprolol 25 BID, eliquis, and to schedule patient for a sleep study. Also stated to schedule f/u in a couple weeks.  Orders placed

## 2019-04-18 NOTE — CONSULTS
[facility]  Cardiology Consult        Patient:  Emerson Menendez  YOB: 1955  MRN: [id number]     Acct: [de-identified]    PCP: Kishore Lunsford MD    Date of Admission: 4/17/2019      Reason for Consultation:  Palpitations      History Of Present Illness:    59 y.o. pleasant female who presented to the hospital with complaints of palpitations. Patient was in outpatient surgical center for cyst removal in the back of her head. Denies any prior cardiac symptoms. At the outpatient center she felt nervous and had palpitations. She was told that she has Afib and was transferred to ER. No prior cardiac workup. EKG shows AFib with RVR @ 160 bpms      Past Medical History:          Diagnosis Date    Cancer ([address]     breast    Hypertension     Rosacea     face       Past Surgical History:          Procedure Laterality Date    BREAST BIOPSY Right 07/10/2017    women's wellness     BREAST LUMPECTOMY Right 7/27/2017    RIGHT LUMPECTOMY AND SENTINEL LYMPH NODE BIOPSY performed by Basilia Valentine MD at [address] [place] [address] SURGERY Right 2014       Medications Prior to Admission:      Prior to Admission medications    Medication Sig Start Date End Date Taking?  Authorizing Provider   anastrozole (ARIMIDEX) 1 MG tablet Take 1 tablet by mouth daily 10/26/18  Yes Rogerio Lawson MD   losartan (COZAAR) 100 MG tablet Take 100 mg by mouth daily   Yes Historical Provider, MD   aspirin 81 MG chewable tablet Take 81 mg by mouth daily    Historical Provider, MD   Multiple Vitamins-Minerals (THERAPEUTIC MULTIVITAMIN-MINERALS) tablet Take 1 tablet by mouth daily    Historical Provider, MD   calcium carbonate 600 MG TABS tablet Take 1 tablet by mouth daily    Historical Provider, MD       Current Facility-Administered Medications   Medication Dose Route Frequency Provider Last Rate Last Dose    diltiazem 125 mg in dextrose 5 % 125 mL infusion  5 mg/hr Intravenous Continuous Ninetta Mass, DO 5 mL/hr at 04/17/19 1810 5 mg/hr at 04/17/19 1810    anastrozole (ARIMIDEX) tablet 1 mg  1 mg Oral Daily Ninetta Mass, DO   1 mg at 04/18/19 0851    aspirin chewable tablet 81 mg  81 mg Oral Daily Ninetta Mass, DO   81 mg at 04/18/19 0851    losartan (COZAAR) tablet 100 mg  100 mg Oral Daily Ninetta Mass, DO   100 mg at 04/18/19 0851    sodium chloride flush 0.9 % injection 10 mL  10 mL Intravenous 2 times per day Ninetta Mass, DO        sodium chloride flush 0.9 % injection 10 mL  10 mL Intravenous PRN Ninetta Mass, DO        [facility] injection 4 mg  4 mg Intravenous Q6H PRN Ninetta Mass, DO        0.9 % sodium chloride infusion   Intravenous Continuous Jose Elias Burdick, DO 50 mL/hr at 04/17/19 1719      polyethylene glycol (GLYCOLAX) packet 17 g  17 g Oral Daily Jose Elias Greenbrier, DO        famotidine (PEPCID) tablet 20 mg  20 mg Oral BID Ninetta Mass, DO   20 mg at 04/18/19 0851    acetaminophen (TYLENOL) tablet 650 mg  650 mg Oral Q4H PRN Ninetta Mass, DO        heparin (porcine) injection 8,840 Units  80 Units/kg Intravenous PRN Ninetta Mass, DO        heparin (porcine) injection 4,420 Units  40 Units/kg Intravenous PRN Ninetta Mass, DO        heparin 25,000 units in dextrose 5% 250 mL infusion  18 Units/kg/hr Intravenous Continuous Ninetta Mass, DO   Stopped at 04/18/19 1013       Allergies:  Tape Dorota Skill tape]    Social History:    TOBACCO:   reports that she has never smoked. She has never used smokeless tobacco.  ETOH:   reports that she drinks alcohol.       Family History:        Problem Relation Age of Onset    High Blood Pressure Mother     Heart Disease Father     Breast Cancer Neg Hx     Cancer Neg Hx     Colon Cancer Neg Hx     Diabetes Neg Hx     Stroke Neg Hx          Review of Systems -   General ROS: negative  Psychological ROS: negative  Hematological and Lymphatic ROS: No history of blood clots or bleeding disorder. Respiratory ROS: no cough, shortness of breath, or wheezing  Cardiovascular ROS: As per HPI  Gastrointestinal ROS: negative  Genito-Urinary ROS: no dysuria, trouble voiding, or hematuria  Musculoskeletal ROS: negative  Neurological ROS: no TIA or stroke symptoms  Dermatological ROS: negative    All others reviewed and are negative.        BP (!) 146/65   Pulse 74   Temp 97.7 °F (36.5 °C) (Oral)   Resp 16   Ht 5' 8\" (1.727 m)   Wt 247 lb 5.7 oz (112.2 kg)   SpO2 97%   BMI 37.61 kg/m²       Physical Examination:   General appearance - alert, in no distress  Mental status - alert, oriented to person, place, and time  Neck - supple, no significant adenopathy, no JVD, or carotid bruits  Chest - clear to auscultation, no wheezes, rales or rhonchi, symmetric air entry  Heart - normal rate, regular rhythm, normal S1, S2, no murmurs, rubs, clicks or gallops  Abdomen - soft, nontender, nondistended, no masses or organomegaly  Neurological - alert, oriented, normal speech, no focal findings or movement disorder noted  Musculoskeletal - no joint tenderness, deformity or swelling  Extremities - peripheral pulses normal, no pedal edema, no clubbing or cyanosis  Skin - normal coloration and turgor, no rashes, no suspicious skin lesions noted      LABS:    Recent Labs     04/17/19  1335 04/17/19  1756 04/18/19  0040   TROPONINT < 0.010 < 0.010 < 0.010     CBC:   Lab Results   Component Value Date    WBC 6.3 04/18/2019    RBC 4.65 04/18/2019    HGB 13.8 04/18/2019    HCT 41.7 04/18/2019    MCV 89.7 04/18/2019    MCH 29.7 04/18/2019    MCHC 33.1 04/18/2019     04/18/2019    MPV 9.5 04/18/2019     BMP:    Lab Results   Component Value Date     04/18/2019    K 4.0 04/18/2019     04/18/2019    CO2 22 04/18/2019    BUN 15 04/18/2019    CREATININE 0.9 04/18/2019    CALCIUM 9.1 04/18/2019    LABGLOM 63 04/18/2019    GLUCOSE 126 04/18/2019     Hepatic Function Panel:  No results found for:

## 2019-04-18 NOTE — DISCHARGE SUMMARY
Hospital Medicine Discharge Summary      Patient Identification:   Rohit Rodriguez   : 1955  MRN: [id number]   Account: [de-identified]      Patient's PCP: Rony Mackenzie MD    Admit Date: 2019     Discharge Date:   2019     Admitting Physician: Satish Rivers DO     Discharge Physician: Katherine Kumar MD     Discharge Diagnoses:    1. New-onset atrial fibrillation, now back to sinus rhythm and rate controlled  2. Essential HTN   3. Mild hypomagnesemia       The patient was seen and examined on day of discharge and this discharge summary is in conjunction with any daily progress note from day of discharge. Hospital Course:     Please see H/P for details. In summary, Rohit Rodriguez is a 59 y.o. Female, w PMH HTN, who was sent to [facility] ER from surgery center ( patient at surgery center on 19 for scalp cyst removal) due to new-onset afib w RVR. Patient received cardizem IV in ER and she was admitted under Hospital Medicine service. She was started on cardizem and heparin gtt. Cardiology consulted. She's now back to [facility]. cardizem gtt weaned and switched to metoprolol. Heparin gtt switched to Eliquis. Echo done 19, awaiting echo report. Please see below for details of hospital course:      1. New-onset atrial fibrillation, now back to sinus rhythm and rate controlled     -CHADsVasc score 2  -reviewed tele, now SNR, rate 70s  -cont metoprolol and Eliquis. Discussed possible side effects of eliquis, pt verbalized understanding of side effects. -echo on 19: \"EF 55-60%, Left ventricular size and systolic function is normal, LV wall thickness is within normal limits and there are no obvious wall motion abnormalities. The right ventricular size appears normal with normal systolic function and wall thickness\".      2. Essential HTN     -cont Metoprolol and losartan        3. Mild hypomagnesemia      -cont magnesium oxide  -repeat magnesium level in OP     4.  Hx of breast cancer    -continue anastrozole         Exam:     Vitals:  Vitals:    04/18/19 0425 04/18/19 0848 04/18/19 1135 04/18/19 1619   BP: (!) 112/57 (!) 146/65 132/62 137/63   Pulse: 65 74 68 74   Resp: 16 16 16 16   Temp: 97.6 °F (36.4 °C) 97.7 °F (36.5 °C) 98.2 °F (36.8 °C) 98.2 °F (36.8 °C)   TempSrc: Oral Oral Oral Oral   SpO2: 97% 97% 96% 97%   Weight: 247 lb 5.7 oz (112.2 kg)      Height:         Weight: Weight: 247 lb 5.7 oz (112.2 kg)     24 hour intake/output:    Intake/Output Summary (Last 24 hours) at 4/18/2019 1910  Last data filed at 4/18/2019 1622  Gross per 24 hour   Intake 2463.03 ml   Output 1800 ml   Net 663.03 ml       General appearance: alert, not in acute distress. Neck: Supple, with full range of motion. Respiratory:  Normal respiratory effort. Clear to auscultation, bilaterally without Rales/Wheezes/Rhonchi. Cardiovascular: normal rate and regular rhythm with normal S1/S2 without murmurs, rubs or gallops. Musculoskeletal: passive and active ROM x 4 extremities. Exam of extremities: no pedal edema noted          Labs: For convenience and continuity at follow-up the following most recent labs are provided:      CBC:    Lab Results   Component Value Date    WBC 6.3 04/18/2019    HGB 13.8 04/18/2019    HCT 41.7 04/18/2019     04/18/2019       Renal:    Lab Results   Component Value Date     04/18/2019    K 4.0 04/18/2019     04/18/2019    CO2 22 04/18/2019    BUN 15 04/18/2019    CREATININE 0.9 04/18/2019    CALCIUM 9.1 04/18/2019         Significant Diagnostic Studies    Radiology:   XR CHEST STANDARD (2 VW)   Final Result      No acute findings. **This report has been created using voice recognition software. It may contain minor errors which are inherent in voice recognition technology. **      Final report electronically signed by Dr. Denice Barksdale on 4/17/2019 2:11 PM             Consults:     IP CONSULT TO CARDIOLOGY    Disposition:    [x] Home       [] TCU       [] Rehab       [] Psych       [] SNF       [] [place]       [] Other-    Condition at Discharge: Stable    Code Status:  Full Code     Patient Instructions:    Discharge lab work: magnesium level   Activity: activity as tolerated  Diet: DIET GENERAL;      Follow-up visits:   Haylee Fleming MD  [address] [place] [address]  [phone]    Schedule an appointment as soon as possible for a visit in 1 week      Eva Juarez MD  [facility]  [address] [facility]  [address] [place] [address] on 6/5/2019  10:30 arrive 15 min early, Id , insurance card, and list of medications     Rosemary Leventhal, MD  [facility] [place] [address]    Go on 5/6/2019  9:15 am         Discharge Medications:      Harrison Goyal   Home Medication Instructions PSO:[id number]    Printed on:04/18/19 1910   Medication Information                      anastrozole (ARIMIDEX) 1 MG tablet  Take 1 tablet by mouth daily             apixaban (ELIQUIS) 5 MG TABS tablet  Take 1 tablet by mouth 2 times daily             aspirin 81 MG chewable tablet  Take 81 mg by mouth daily             calcium carbonate 600 MG TABS tablet  Take 1 tablet by mouth daily             losartan (COZAAR) 100 MG tablet  Take 100 mg by mouth daily             magnesium oxide (MAG-OX) 400 (241.3 Mg) MG TABS tablet  Take 1 tablet by mouth daily             metoprolol tartrate (LOPRESSOR) 25 MG tablet  Take 1 tablet by mouth 2 times daily             Multiple Vitamins-Minerals (THERAPEUTIC MULTIVITAMIN-MINERALS) tablet  Take 1 tablet by mouth daily                 Time Spent on discharge is more than 30 minutes in the examination, evaluation, counseling and review of medications and discharge plan. Signed: Thank you Haylee Fleming MD for the opportunity to be involved in this patient's care.     Electronically signed by Patrizia Guevara MD on 4/18/2019 at 7:10 PM

## 2019-04-18 NOTE — PROGRESS NOTES
Clarified with Laurice Osler that patient is ok for discharge. He stated patient is ok for discharge with follow up appointment. Follow up appointment already scehduled        IV and cardiac monitor removed from patient. Patient assessed and denies needs. Patient with all belongings. New prescriptions and discharge paperwork given and explained to patient via teach back method and she denies questions. Patient brought down to discharge lobby via wheelchair and  to bring her home.

## 2019-04-18 NOTE — PROGRESS NOTES
Hospitalist Progress Note    Patient:  Lexi Otero      Unit/Bed:3B-37/037-A    YOB: 1955    MRN: [id number]       Acct: [de-identified]     PCP: Jigar Higginbotham MD    Date of Admission: 4/17/2019    Chief Complaint:   Chief Complaint   Patient presents with    Atrial [address] Course:     Please see H/P for details. In summary, this is a 59 y.o. Female, w PMH HTN, who was sent to [facility] ER from surgery center ( patient at surgery center on 4/17/19 for scalp cyst removal) due to new-onset afib w RVR. Patient received cardizem IV in ER and she was admitted under Hospital Medicine service. She was started on cardizem and heparin gtt. Cardiology consulted. She's now back to [facility]. cardizem gtt weaned and switched to metoprolol. Heparin gtt switched to Eliquis. Echo done 4/18/19, awaiting echo report. Subjective:     Patient seen and examined. Pt reports she feels fine. She denies chest pain, sob, palpitations, dizziness. Medications:  Reviewed    Infusion Medications    sodium chloride 50 mL/hr at 04/17/19 1719     Scheduled Medications    magnesium oxide  400 mg Oral Daily    metoprolol tartrate  25 mg Oral BID    apixaban  5 mg Oral BID    anastrozole  1 mg Oral Daily    aspirin  81 mg Oral Daily    losartan  100 mg Oral Daily    sodium chloride flush  10 mL Intravenous 2 times per day    polyethylene glycol  17 g Oral Daily    famotidine  20 mg Oral BID     PRN Meds: sodium chloride flush, ondansetron, acetaminophen      Intake/Output Summary (Last 24 hours) at 4/18/2019 1428  Last data filed at 4/18/2019 1350  Gross per 24 hour   Intake 1663.03 ml   Output 1600 ml   Net 63.03 ml       Diet:  DIET GENERAL;    Exam:  /62   Pulse 68   Temp 98.2 °F (36.8 °C) (Oral)   Resp 16   Ht 5' 8\" (1.727 m)   Wt 247 lb 5.7 oz (112.2 kg)   SpO2 96%   BMI 37.61 kg/m²     General appearance: alert, not in acute distress. Neck: Supple, with full range of motion. Respiratory:  Normal respiratory effort. Clear to auscultation, bilaterally without Rales/Wheezes/Rhonchi. Cardiovascular: normal rate and regular rhythm with normal S1/S2 without murmurs, rubs or gallops. Musculoskeletal: passive and active ROM x 4 extremities. Exam of extremities: no pedal edema noted      Labs:   Recent Labs     04/17/19  1335 04/18/19  0348   WBC 6.8 6.3   HGB 16.1* 13.8   HCT 46.9 41.7    270     Recent Labs     04/17/19  1335 04/18/19  0348    139   K 4.1 4.0    104   CO2 22* 22*   BUN 16 15   CREATININE 0.8 0.9   CALCIUM 10.0 9.1     No results for input(s): AST, ALT, BILIDIR, BILITOT, ALKPHOS in the last 72 hours. Recent Labs     04/17/19  1335   INR 1.05     No results for input(s): [place] in the last 72 hours. Urinalysis:    No results found for: Shorty Kotyk, BACTERIA, RBCUA, BLOODU, SPECGRAV, [address]    Radiology:  XR CHEST STANDARD (2 VW)   Final Result      No acute findings. **This report has been created using voice recognition software. It may contain minor errors which are inherent in voice recognition technology. **      Final report electronically signed by Dr. Ramírez Bishop on 4/17/2019 2:11 PM            Assessment/Plan: This is a 59 y.o. Female    1. New-onset atrial fibrillation, now back to sinus rhythm and rate controlled    -CHADsVasc score 2  -reviewed tele, now SNR, rate 70s  -cont metoprolol and Eliquis. Discussed possible side effects of eliquis, pt verbalized understanding of side effects.  -awaiting echo report    2. Essential HTN    -cont Metoprolol  -VS per protocol    3.  Mild hypomagnesemia     -start magnesium oxide  -repeat magnesium level in am or in OP if DC today        Anticipated Discharge in : pending         Diet: DIET GENERAL;    DVT prophylaxis: [] Lovenox                                 [] SCDs                                 [] SQ Heparin                                 [] Encourage ambulation           []

## 2019-04-19 ENCOUNTER — CARE COORDINATION (OUTPATIENT)
Dept: CASE MANAGEMENT | Age: 64
End: 2019-04-19

## 2019-04-19 NOTE — CARE COORDINATION
Late Entry:    4/19/19, 3:06 PM    Discharge plan discussed by  and . Discharge plan reviewed with patient/ family. Patient/ family verbalize understanding of discharge plan and are in agreement with plan. Understanding was demonstrated using the teach back method.
AM    Discharge Plan: Pt lives at home with spouse. She is able to get to appointments and can afford medications. She denies having medical equipment at home. She has never used HH and does not feel that she will need anything at discharge. Will continue to follow for needs.       Evaluation: no

## 2019-04-30 ENCOUNTER — HOSPITAL ENCOUNTER (OUTPATIENT)
Dept: WOMENS IMAGING | Age: 64
Discharge: HOME | End: 2019-04-30
Payer: COMMERCIAL

## 2019-04-30 DIAGNOSIS — Z85.3 HISTORY OF RIGHT BREAST CANCER: ICD-10-CM

## 2019-04-30 PROCEDURE — 77063 BREAST TOMOSYNTHESIS BI: CPT

## 2019-05-07 ENCOUNTER — OFFICE VISIT (OUTPATIENT)
Dept: CARDIOLOGY CLINIC | Age: 64
End: 2019-05-07
Payer: COMMERCIAL

## 2019-05-07 VITALS
HEART RATE: 76 BPM | DIASTOLIC BLOOD PRESSURE: 78 MMHG | SYSTOLIC BLOOD PRESSURE: 143 MMHG | BODY MASS INDEX: 37.56 KG/M2 | WEIGHT: 247.8 LBS | HEIGHT: 68 IN

## 2019-05-07 DIAGNOSIS — I10 ESSENTIAL HYPERTENSION: ICD-10-CM

## 2019-05-07 DIAGNOSIS — I48.0 PAF (PAROXYSMAL ATRIAL FIBRILLATION) (HCC): Primary | ICD-10-CM

## 2019-05-07 PROCEDURE — 99213 OFFICE O/P EST LOW 20 MIN: CPT | Performed by: PHYSICIAN ASSISTANT

## 2019-05-07 RX ORDER — LOSARTAN POTASSIUM 100 MG/1
100 TABLET ORAL DAILY
Qty: 90 TABLET | Refills: 4 | Status: SHIPPED | OUTPATIENT
Start: 2019-05-07 | End: 2020-06-17 | Stop reason: SDUPTHER

## 2019-05-07 NOTE — PROGRESS NOTES
Hospital follow up. Denies cp, sob, palpitations, dizziness, lightheaded and STEPH. [facility] PROFESSIONAL SERVICES  [facility] [address]   [address] [place] [address]   Dept: [phone]   Dept Fax: [phone]: [phone]      Chief Complaint   Patient presents with    Follow-Up from [facility] Atrial Fibrillation     Patient with a history of new-onset atrial fibrillation presents for follow-up. She states since her previous visit she has not had any known episodes of A. Fib. She denies any chest pain, shortness of breath or palpitations. She is not having any issues with her current medications. Cardiologist:  Dr. Ansley Weaver:   No fever, no chills, No fatigue or weight loss  Pulmonary:    No dyspnea, no wheezing  Cardiac:    Denies recent chest pain   GI:     No nausea or vomiting, no abdominal pain  Neuro:    No dizziness or light headedness  Musculoskeletal:  No recent active issues  Extremities:   No edema, good peripheral pulses      Past Medical History:   Diagnosis Date    Cancer ([facility] [address]     breast    Hypertension     Rosacea     face       Allergies   Allergen Reactions    Tape [Adhesive Tape] Rash       Current Outpatient Medications   Medication Sig Dispense Refill    metoprolol tartrate (LOPRESSOR) 25 MG tablet Take 1.5 tablets by mouth 2 times daily 180 tablet 4    losartan (COZAAR) 100 MG tablet Take 1 tablet by mouth daily 90 tablet 4    anastrozole (ARIMIDEX) 1 MG tablet Take 1 tablet by mouth daily 90 tablet 3    Multiple Vitamins-Minerals (THERAPEUTIC MULTIVITAMIN-MINERALS) tablet Take 1 tablet by mouth daily      calcium carbonate 600 MG TABS tablet Take 1 tablet by mouth daily      apixaban (ELIQUIS) 5 MG TABS tablet Take 1 tablet by mouth 2 times daily 180 tablet 0     No current facility-administered medications for this visit.         Social History     Socioeconomic History    Marital status:      Spouse name: None    Number of children: None    Years of education: None    Highest education level: None   Occupational History    None   Social Needs    Financial resource strain: None    Food insecurity:     Worry: None     Inability: None    Transportation needs:     Medical: None     Non-medical: None   Tobacco Use    Smoking status: Never Smoker    Smokeless tobacco: Never Used   Substance and Sexual Activity    Alcohol use: Yes     Comment: occ.  Drug use: No    Sexual activity: None   Lifestyle    Physical activity:     Days per week: None     Minutes per session: None    Stress: None   Relationships    Social connections:     Talks on phone: None     Gets together: None     Attends [employer] service: None     Active member of club or organization: None     Attends meetings of clubs or organizations: None     Relationship status: None    Intimate partner violence:     Fear of current or ex partner: None     Emotionally abused: None     Physically abused: None     Forced sexual activity: None   Other Topics Concern    None   Social History Narrative    None       Family History   Problem Relation Age of Onset    High Blood Pressure Mother     Heart Disease Father     Breast Cancer Neg Hx     Cancer Neg Hx     Colon Cancer Neg Hx     Diabetes Neg Hx     Stroke Neg Hx        Blood pressure (!) 143/78, pulse 76, height 5' 8\" (1.727 m), weight 247 lb 12.8 oz (112.4 kg), not currently breastfeeding. General:   Well developed, well nourished  Lungs:   Clear to auscultation  Heart:    Normal S1 S2, No murmur, rubs, or gallops  Abdomen:   Soft, non tender, no organomegalies, positive bowel sounds  Extremities:   No edema, no cyanosis, good peripheral pulses  Neurological:   Awake, alert, oriented.  No obvious focal deficits  Musculoskeletal:  No obvious deformities      Echo 4/18/2019  Conclusions      Summary   Left ventricular size and systolic function is normal. Ejection fraction   was estimated at 55-60%. LV wall thickness is within normal limits and   there are no obvious wall motion abnormalities.   The right ventricular size appears normal with normal systolic function   and wall thickness. Diagnosis Orders   1. PAF (paroxysmal atrial fibrillation) ([address]     2. Essential hypertension         Continue Dr Kevon Kehr current treatment plan    Continue same current medications and with constant vigilance to changes in symptoms and also any potential side effects. Return for care if become worse or seek medical attention immediately. The patient is educated on symptoms of heart disease that include chest pain and passing out, dizziness, etc and to report them if there is any change or go to emergency room.        Follow up with Dr Antony Escobar as scheduled or sooner if needed  (Please note that portions of this note were completed with a voice recognition program.  Efforts were made to edit the dictation but occasionally words are mis-transcribed.)      Bennie Silveira PA-C

## 2019-05-14 NOTE — TELEPHONE ENCOUNTER
Dawn Sagastume called requesting a refill on the following medications:  Requested Prescriptions     Pending Prescriptions Disp Refills    apixaban (ELIQUIS) 5 MG TABS tablet 180 tablet 4     Sig: Take 1 tablet by mouth 2 times daily     Pharmacy verified:  [email or web address]    [address]    Date of last visit: 05/07/19  Date of next visit (if applicable): 7/05/4720

## 2019-08-12 ENCOUNTER — OFFICE VISIT (OUTPATIENT)
Dept: CARDIOLOGY CLINIC | Age: 64
End: 2019-08-12
Payer: COMMERCIAL

## 2019-08-12 VITALS
SYSTOLIC BLOOD PRESSURE: 142 MMHG | HEIGHT: 68 IN | BODY MASS INDEX: 37.44 KG/M2 | WEIGHT: 247 LBS | DIASTOLIC BLOOD PRESSURE: 70 MMHG | HEART RATE: 84 BPM

## 2019-08-12 DIAGNOSIS — I48.91 ATRIAL FIBRILLATION, UNSPECIFIED TYPE (HCC): Primary | ICD-10-CM

## 2019-08-12 PROCEDURE — 99213 OFFICE O/P EST LOW 20 MIN: CPT | Performed by: INTERNAL MEDICINE

## 2019-10-09 ENCOUNTER — OFFICE VISIT (OUTPATIENT)
Dept: ONCOLOGY | Age: 64
End: 2019-10-09
Payer: COMMERCIAL

## 2019-10-09 ENCOUNTER — HOSPITAL ENCOUNTER (OUTPATIENT)
Dept: INFUSION THERAPY | Age: 64
Discharge: HOME | End: 2019-10-09
Payer: COMMERCIAL

## 2019-10-09 VITALS
HEIGHT: 68 IN | TEMPERATURE: 99 F | DIASTOLIC BLOOD PRESSURE: 79 MMHG | SYSTOLIC BLOOD PRESSURE: 143 MMHG | HEART RATE: 83 BPM | OXYGEN SATURATION: 97 % | BODY MASS INDEX: 37.28 KG/M2 | WEIGHT: 246 LBS | RESPIRATION RATE: 18 BRPM

## 2019-10-09 DIAGNOSIS — C50.511 MALIGNANT NEOPLASM OF LOWER-OUTER QUADRANT OF RIGHT BREAST OF FEMALE, ESTROGEN RECEPTOR POSITIVE (HCC): ICD-10-CM

## 2019-10-09 DIAGNOSIS — Z98.890 S/P LUMPECTOMY, RIGHT BREAST: Primary | ICD-10-CM

## 2019-10-09 DIAGNOSIS — Z08 ENCOUNTER FOR FOLLOW-UP SURVEILLANCE OF BREAST CANCER: ICD-10-CM

## 2019-10-09 DIAGNOSIS — Z85.3 ENCOUNTER FOR FOLLOW-UP SURVEILLANCE OF BREAST CANCER: ICD-10-CM

## 2019-10-09 DIAGNOSIS — Z17.0 MALIGNANT NEOPLASM OF LOWER-OUTER QUADRANT OF RIGHT BREAST OF FEMALE, ESTROGEN RECEPTOR POSITIVE (HCC): ICD-10-CM

## 2019-10-09 DIAGNOSIS — Z79.811 PROPHYLACTIC USE OF ANASTROZOLE (ARIMIDEX): ICD-10-CM

## 2019-10-09 PROCEDURE — 99214 OFFICE O/P EST MOD 30 MIN: CPT | Performed by: INTERNAL MEDICINE

## 2019-10-09 PROCEDURE — 99211 OFF/OP EST MAY X REQ PHY/QHP: CPT

## 2019-10-09 RX ORDER — ANASTROZOLE 1 MG/1
1 TABLET ORAL DAILY
Qty: 90 TABLET | Refills: 3 | Status: SHIPPED | OUTPATIENT
Start: 2019-10-09 | End: 2019-10-14 | Stop reason: SDUPTHER

## 2019-10-09 ASSESSMENT — ENCOUNTER SYMPTOMS
SORE THROAT: 0
EYE DISCHARGE: 0
ABDOMINAL DISTENTION: 0
DIARRHEA: 0
VOMITING: 0
BLOOD IN STOOL: 0
CHEST TIGHTNESS: 0
COUGH: 0
BACK PAIN: 0
ABDOMINAL PAIN: 0
TROUBLE SWALLOWING: 0
RECTAL PAIN: 0
NAUSEA: 0
SHORTNESS OF BREATH: 0
FACIAL SWELLING: 0
WHEEZING: 0
CONSTIPATION: 0
COLOR CHANGE: 0

## 2019-10-14 RX ORDER — ANASTROZOLE 1 MG/1
1 TABLET ORAL DAILY
Qty: 90 TABLET | Refills: 3 | Status: SHIPPED | OUTPATIENT
Start: 2019-10-14 | End: 2020-07-15 | Stop reason: SDUPTHER

## 2019-11-13 ENCOUNTER — HOSPITAL ENCOUNTER (OUTPATIENT)
Dept: WOMENS IMAGING | Age: 64
Discharge: HOME | End: 2019-11-13
Payer: COMMERCIAL

## 2019-11-13 DIAGNOSIS — Z78.0 POSTMENOPAUSAL: ICD-10-CM

## 2019-11-13 PROCEDURE — 77080 DXA BONE DENSITY AXIAL: CPT

## 2020-05-22 ENCOUNTER — HOSPITAL ENCOUNTER (OUTPATIENT)
Dept: WOMENS IMAGING | Age: 65
Discharge: HOME | End: 2020-05-22
Payer: MEDICARE

## 2020-05-22 PROCEDURE — 77063 BREAST TOMOSYNTHESIS BI: CPT

## 2020-06-03 ENCOUNTER — HOSPITAL ENCOUNTER (OUTPATIENT)
Dept: RADIATION ONCOLOGY | Age: 65
Discharge: HOME | End: 2020-06-03
Payer: MEDICARE

## 2020-06-03 VITALS
OXYGEN SATURATION: 99 % | TEMPERATURE: 96.2 F | BODY MASS INDEX: 37.62 KG/M2 | HEART RATE: 77 BPM | RESPIRATION RATE: 16 BRPM | WEIGHT: 247.38 LBS | DIASTOLIC BLOOD PRESSURE: 69 MMHG | SYSTOLIC BLOOD PRESSURE: 144 MMHG

## 2020-06-03 PROCEDURE — 99212 OFFICE O/P EST SF 10 MIN: CPT | Performed by: NURSE PRACTITIONER

## 2020-06-03 PROCEDURE — 99213 OFFICE O/P EST LOW 20 MIN: CPT | Performed by: NURSE PRACTITIONER

## 2020-06-03 NOTE — PROGRESS NOTES
[facility], [address]  Phone: [phone]   Toll Free: [phone]   Fax: [phone]    RADIATION ONCOLOGY FOLLOW UP REPORT    PATIENT NAME:  Sanna Mendes     : 1955  MEDICAL RECORD NO: [id number]    LOCATION: [facility] NO: [id number]      PROVIDER: SAIMA Orta CNP        DATE OF SERVICE: 6/3/2020    FOLLOW UP PHYSICIANS: Dr. Bowden, Dr. Ron French -- well-differentiated invasive ductal carcinoma of the right lower outer quadrant female breast with associated grade 2 DCIS,pT2 N0 M0, Stage IIA, ER+, NJ+, Her2-.     DATE OF DIAGNOSIS: 7/10/2017     END OF TREATMENT DATE: 10/10/2017     ECOG PERFORMANCE STATUS: 0     PAIN: Denies     CHAPERONE: Declined        HPI: Sonya sought evaluation for palpable right breast nodule. She underwent diagnostic right mammogram and ultrasound on 2017.  The showed an irregular 1.5 cm mass in the right breast lower outer aspect posterior depth.  Biopsy confirmed the presence of well differentiated invasive ductal carcinoma as described above.  She discussed her treatment options during her surgical consultation and elected to undergo breast conservation surgery.  She underwent lumpectomy and sentinel lymph node biopsy 2017.  Final pathology showed a 2.1 cm invasive cancer with associated DCIS and clear surgical margins.  The sentinel lymph node was negative. Musa Wright has been seen in hematology oncology and Oncotype DX score was 17 placing her in the low risk category.  She received a recommendation for radiation therapy after discussion regarding the role of radiation therapy in the management of her breast cancer she was agreeable to proceed with XRT.  Nita Pruitt experienced a increase in fatigue, mild breast tenderness and moderate skin erythema during her treatment.        INTERVAL HISTORY: Nita Pruitt returns to [employer] a post treatment check up after undergoing surgery and radiation therapy for breast cancer. Irineo Bashir denies any changes in her health condition since her last visit. She denies complaints related to her radiation treatment at this time. Lawerence Render states her hot flashes have improved since her last visit,  they are less frequent and not as intense as they were when she first started taking Arimidex. She denies fatigue, chest pain, sob, nausea, vomiting, breast pain or swelling, loss of right upper extremity ROM. LAB RESULTS:   No recent labs for review. RADIOLOGY RESULTS:   5/22/2020: Bilateral Mammogram:   IMPRESSION: Mammogram BI-RADS: 2: Benign       There is no mammographic evidence of malignancy. A 1 year    screening mammogram is recommended. 11/13/19: DEXA:   IMPRESSION: BONE DENSITY WITHIN NORMAL LIMITS   Patient is at normal risk for fracture. MEDICATIONS:   Current Outpatient Medications   Medication Sig Dispense Refill    metoprolol tartrate (LOPRESSOR) 25 MG tablet TAKE 1 AND 1/2 TABLETS BY MOUTH TWICE DAILY. 180 tablet 1    anastrozole (ARIMIDEX) 1 MG tablet Take 1 tablet by mouth daily 90 tablet 3    apixaban (ELIQUIS) 5 MG TABS tablet Take 1 tablet by mouth 2 times daily 180 tablet 0    losartan (COZAAR) 100 MG tablet Take 1 tablet by mouth daily 90 tablet 4    Multiple Vitamins-Minerals (THERAPEUTIC MULTIVITAMIN-MINERALS) tablet Take 1 tablet by mouth daily      calcium carbonate 600 MG TABS tablet Take 1 tablet by mouth daily       No current facility-administered medications for this encounter. ROS: As noted in the HPI and Interval history, otherwise negative. EXAMINATION:   GENERAL: Irineo Bashir is a pleasant well-developed adult female. Viji Gee is alert and oriented ×3 in no acute distress. VITAL SIGNS:  Weight 247.6 pounds, temperature 96.2°F, pulse 77, blood pressure 144/69, respirations 16, pulse ox 99% room air.   HEENT: Normocephalic, atraumatic.  PERRL.  EOMI.  Ears, nose

## 2020-06-10 NOTE — TELEPHONE ENCOUNTER
Kamini Belcher called requesting a refill on the following medications:  Requested Prescriptions     Pending Prescriptions Disp Refills    apixaban (ELIQUIS) 5 MG TABS tablet 180 tablet 0     Sig: Take 1 tablet by mouth 2 times daily     Pharmacy verified:  [facility]      Date of last visit: 8/12/19  Date of next visit (if applicable): 6/71/88

## 2020-06-17 ENCOUNTER — TELEPHONE (OUTPATIENT)
Dept: CARDIOLOGY CLINIC | Age: 65
End: 2020-06-17

## 2020-06-17 RX ORDER — LOSARTAN POTASSIUM 100 MG/1
100 TABLET ORAL DAILY
Qty: 90 TABLET | Refills: 0 | Status: SHIPPED | OUTPATIENT
Start: 2020-06-17 | End: 2020-09-02 | Stop reason: SDUPTHER

## 2020-07-15 RX ORDER — ANASTROZOLE 1 MG/1
1 TABLET ORAL DAILY
Qty: 90 TABLET | Refills: 3 | Status: SHIPPED | OUTPATIENT
Start: 2020-07-15 | End: 2020-10-09 | Stop reason: SDUPTHER

## 2020-07-22 ENCOUNTER — OFFICE VISIT (OUTPATIENT)
Dept: CARDIOLOGY CLINIC | Age: 65
End: 2020-07-22
Payer: MEDICARE

## 2020-07-22 VITALS
DIASTOLIC BLOOD PRESSURE: 84 MMHG | BODY MASS INDEX: 37.59 KG/M2 | SYSTOLIC BLOOD PRESSURE: 188 MMHG | HEART RATE: 70 BPM | HEIGHT: 68 IN | WEIGHT: 248 LBS

## 2020-07-22 PROCEDURE — 1123F ACP DISCUSS/DSCN MKR DOCD: CPT | Performed by: INTERNAL MEDICINE

## 2020-07-22 PROCEDURE — 99214 OFFICE O/P EST MOD 30 MIN: CPT | Performed by: INTERNAL MEDICINE

## 2020-07-22 PROCEDURE — 1090F PRES/ABSN URINE INCON ASSESS: CPT | Performed by: INTERNAL MEDICINE

## 2020-07-22 PROCEDURE — G8427 DOCREV CUR MEDS BY ELIG CLIN: HCPCS | Performed by: INTERNAL MEDICINE

## 2020-07-22 PROCEDURE — 4040F PNEUMOC VAC/ADMIN/RCVD: CPT | Performed by: INTERNAL MEDICINE

## 2020-07-22 PROCEDURE — 93000 ELECTROCARDIOGRAM COMPLETE: CPT | Performed by: INTERNAL MEDICINE

## 2020-07-22 PROCEDURE — 1036F TOBACCO NON-USER: CPT | Performed by: INTERNAL MEDICINE

## 2020-07-22 PROCEDURE — 3017F COLORECTAL CA SCREEN DOC REV: CPT | Performed by: INTERNAL MEDICINE

## 2020-07-22 PROCEDURE — G8399 PT W/DXA RESULTS DOCUMENT: HCPCS | Performed by: INTERNAL MEDICINE

## 2020-07-22 PROCEDURE — G8417 CALC BMI ABV UP PARAM F/U: HCPCS | Performed by: INTERNAL MEDICINE

## 2020-07-22 RX ORDER — CARVEDILOL 6.25 MG/1
6.25 TABLET ORAL 2 TIMES DAILY
Qty: 30 TABLET | Refills: 3 | Status: SHIPPED | OUTPATIENT
Start: 2020-07-22 | End: 2020-07-23 | Stop reason: SDUPTHER

## 2020-07-22 RX ORDER — CARVEDILOL 6.25 MG/1
6.25 TABLET ORAL 2 TIMES DAILY
Qty: 180 TABLET | Refills: 3 | Status: CANCELLED | OUTPATIENT
Start: 2020-07-22

## 2020-07-22 NOTE — PROGRESS NOTES
[address] [place] [address],[place] [address] [address]  [place] [address]  Dept: [phone]  Dept Fax: [phone]  Loc: [phone]    Visit Date: 7/22/2020    Ms. Carrie Morales is a 72 y.o. female  who presented for:  Chief Complaint   Patient presents with    Follow-up     atrial fib       HPI:   72year old female with a history of new-onset atrial fibrillation presents for follow-up. Denies any chest pain, shortness of breath, palpitations, lightheadedness, syncope. Current Outpatient Medications:     anastrozole (ARIMIDEX) 1 MG tablet, Take 1 tablet by mouth daily, Disp: 90 tablet, Rfl: 3    losartan (COZAAR) 100 MG tablet, Take 1 tablet by mouth daily, Disp: 90 tablet, Rfl: 0    apixaban (ELIQUIS) 5 MG TABS tablet, Take 1 tablet by mouth 2 times daily, Disp: 180 tablet, Rfl: 0    metoprolol tartrate (LOPRESSOR) 25 MG tablet, TAKE 1 AND 1/2 TABLETS BY MOUTH TWICE DAILY. , Disp: 180 tablet, Rfl: 1    Multiple Vitamins-Minerals (THERAPEUTIC MULTIVITAMIN-MINERALS) tablet, Take 1 tablet by mouth daily, Disp: , Rfl:     calcium carbonate 600 MG TABS tablet, Take 1 tablet by mouth daily, Disp: , Rfl:     Past Medical History  Alisa Nicholas  has a past medical history of Cancer ([facility] [address] Dry eye, Hypertension, and Rosacea. Social History  Alisa Nicholas  reports that she has never smoked. She has never used smokeless tobacco. She reports current alcohol use. She reports that she does not use drugs. Family History  Alisa Nicholas family history includes Heart Disease in her father; High Blood Pressure in her mother.     Past Surgical History   Past Surgical History:   Procedure Laterality Date    BREAST BIOPSY Right 07/10/2017    [facility]     BREAST LUMPECTOMY Right 7/27/2017    RIGHT LUMPECTOMY AND SENTINEL LYMPH NODE BIOPSY performed by Sherley Rodriguez MD at [address] [place] [address] Right 2014       Subjective:     REVIEW OF HGB 13.8 04/18/2019    HCT 41.7 04/18/2019    MCV 89.7 04/18/2019    MCH 29.7 04/18/2019    MCHC 33.1 04/18/2019     04/18/2019    MPV 9.5 04/18/2019       Lab Results   Component Value Date     04/18/2019    K 4.0 04/18/2019     04/18/2019    CO2 22 04/18/2019    BUN 15 04/18/2019    CREATININE 0.9 04/18/2019    CALCIUM 9.1 04/18/2019    LABGLOM 63 04/18/2019    GLUCOSE 126 04/18/2019       No results found for: ALKPHOS, ALT, AST, PROT, BILITOT, BILIDIR, IBILI, LABALBU    Lab Results   Component Value Date    MG 1.9 04/18/2019       Lab Results   Component Value Date    INR 1.05 04/17/2019         No results found for: LABA1C    No results found for: TRIG, HDL, LDLCALC, LDLDIRECT, LABVLDL    Lab Results   Component Value Date    TSH 3.150 04/17/2019         Testing Reviewed:      I haveindividually reviewed the below cardiac tests    EKG:    ECHO:   Results for orders placed during the hospital encounter of 04/17/19   Echocardiogram 2D/ M-Mode/ Colorflow/ Do    Narrative Transthoracic Echocardiography Report (TTE)     Demographics      Patient Name   Florence Munoz  Gender              Female                  B      MR #           [id number]      Race                                                    Ethnicity      Account #      [de-identified]      Room Number         0037      Accession      [id number]      Date of Study       04/18/2019   Number      Date of Birth  1955     Referring Physician Mariah Mcnamara DO      Age            59 year(s)     Sonographer         Chase Goodwin RDCS,                                                     RVT                                    Interpreting        Kenan Moffett MD                                 Physician     Procedure    Type of Study      TTE procedure:ECHOCARDIOGRAM COMPLETE 2D W DOPPLER W COLOR.      Procedure Date  Date: 04/18/2019 Start: 09:34 AM    Study Location: Bedside  Technical Quality: Adequate visualization    Indications:New onset atrial fibrillation. Additional Medical History:hypertension, history of breast cancer    Patient Status: Routine    Height: 68 inches Weight: 247.01 pounds BSA: 2.24 m^2 BMI: 37.56 kg/m^2    BP: 112/57 mmHg     Conclusions      Summary   Left ventricular size and systolic function is normal. Ejection fraction   was estimated at 55-60%. LV wall thickness is within normal limits and   there are no obvious wall motion abnormalities. The right ventricular size appears normal with normal systolic function   and wall thickness. Signature      ----------------------------------------------------------------   Electronically signed by Ovidio Castano MD (Interpreting   physician) on 04/18/2019 at 06:32 PM   ----------------------------------------------------------------      Findings      Mitral Valve   The mitral valve structure is normal with normal leaflet separation. DOPPLER: The transmitral velocity was within the normal range with no   evidence for mitral stenosis. There was no evidence of mitral   regurgitation. Aortic Valve   The aortic valve appears trileaflet with normal thickness and leaflet   excursion. DOPPLER: Transaortic velocity was within the normal range with   no evidence of aortic stenosis. There was no evidence of aortic   regurgitation. Tricuspid Valve   The tricuspid valve structure is normal with normal leaflet separation. DOPPLER: There is no evidence of tricuspid stenosis. There was no evidence   of tricuspid regurgitation. Pulmonic Valve   The pulmonic valve leaflets appear normal thickness, and normal cuspal   separation. DOPPLER: The transpulmonic velocity was within the normal   range. No evidence for regurgitation.       Left Atrium   Left atrial size is normal.      Left Ventricle   Left ventricular size and systolic function is normal. Ejection fraction   was estimated at 55-60%. LV wall thickness is within normal limits and   there are no obvious wall motion abnormalities. Right Atrium   Right atrial size was normal.      Right Ventricle   The right ventricular size appears normal with normal systolic function   and wall thickness. Pericardial Effusion   The pericardium appears normal with no evidence of a pericardial effusion. Pleural Effusion   No evidence of pleural effusion. Aorta / Great Vessels   -Aortic root dimension within normal limits. -IVC size is within normal limits with normal respiratory phasic changes.      M-Mode/2D Measurements & Calculations      LV Diastolic    LV Systolic Dimension:    AV Cusp Separation: 1.9 cmLA   Dimension: 4.7  3.2 cm                    Dimension: 3.7 cmAO Root   cm              LV Volume Diastolic: 374  Dimension: 2.4 cmLA Area: 17.4   LV FS:31.9 %    ml                        cm^2   LV PW           LV Volume Systolic: 41 ml   Diastolic: 1 cm LV EDV/LV EDV Index: 102   Septum          ml/46 m^2LV ESV/LV ESV   Diastolic: 0.8  Index: 41 OR/24 m^2       RV Diastolic Dimension: 2.2 cm   cm              EF Calculated: 59.8 %                                             LA/Aorta: 1.54                                                LA volume/Index: 53.1 ml /24m^2     Doppler Measurements & Calculations      MV Peak E-Wave: 92.7 cm/s  AV Peak Velocity: 143 LVOT Peak Velocity: 91.3   MV Peak A-Wave: 105 cm/s   cm/s                  cm/s   MV E/A Ratio: 0.88         AV Peak Gradient:     LVOT Peak Gradient: 3   MV Peak Gradient: 3.44     8.18 mmHg             mmHg   mmHg                                                    TV Peak E-Wave: 50.1 cm/s   MV Deceleration Time: 306                        TV Peak A-Wave: 39.2 cm/s   msec                                                    TV Peak Gradient: 1 mmHg                                                    TR Velocity:219 cm/s   MV E' Septal Velocity: 7                         TR Gradient:19.18 mmHg   cm/s                       AV DVI (Vmax):0.64    PV Peak Velocity: 108   MV A' Septal Velocity:                           cm/s   10.4 cm/s                                        PV Peak Gradient: 4.67   MV E' Lateral Velocity:                          mmHg   8.2 cm/s   MV A' Lateral Velocity:   11.9 cm/s   E/E' septal: 13.24   E/E' lateral: 11.3     [email or web address] [id number]       STRESS:    CATH:    Assessment/Plan       Diagnosis Orders   1. Essential hypertension  EKG 12 lead     Afib [id number]: 2  HTN-uncontrolled    On eliquis  [id number]:2  No bleeding issues  Will switch metoprolol to Coreg  Advised to monitor BP at home   Continue rest of the management  The patient is asked to make an attempt to improve diet and exercise patterns to aid in medical management of this problem. Advised more plant based nutrition/meditarrean diet   Advised patient to call office or seek immediate medical attention if there is any new onset of  any chest pain, sob, palpitations, lightheadedness, dizziness, orthopnea, PND or pedal edema. All medication side effects were discussed in details. Thank youfor allowing me to participate in the care of this patient. Please do not hesitate to contact me for any further questions. No follow-ups on file.        Electronically signed by Lord Joe MD [facility]  7/22/2020 at 3:25 PM

## 2020-07-22 NOTE — PROGRESS NOTES
Pt C/O Pt has no complaints cardiac related at this time.        Pt denies CP, SOB, Headache, dizziness, heart palpitations, swelling, fatigue

## 2020-07-23 RX ORDER — CARVEDILOL 6.25 MG/1
6.25 TABLET ORAL 2 TIMES DAILY
Qty: 30 TABLET | Refills: 3 | Status: SHIPPED | OUTPATIENT
Start: 2020-07-23 | End: 2020-09-09 | Stop reason: SDUPTHER

## 2020-07-23 NOTE — TELEPHONE ENCOUNTER
Yonatan Yanes called requesting a refill on the following medications:  Requested Prescriptions     Pending Prescriptions Disp Refills    metoprolol tartrate (LOPRESSOR) 25 MG tablet 180 tablet 1    carvedilol (COREG) 6.25 MG tablet 30 tablet 3     Sig: Take 1 tablet by mouth 2 times daily     Ph[facility] [address] [place] [address]    Date of last visit: 07/22/2020  Date of next visit (if applicable): 20/02/4263

## 2020-09-02 RX ORDER — LOSARTAN POTASSIUM 100 MG/1
100 TABLET ORAL DAILY
Qty: 90 TABLET | Refills: 0 | Status: SHIPPED | OUTPATIENT
Start: 2020-09-02 | End: 2020-09-09 | Stop reason: SDUPTHER

## 2020-09-02 NOTE — TELEPHONE ENCOUNTER
Harry Saucedo called requesting a refill on the following medications:  Requested Prescriptions     Pending Prescriptions Disp Refills    apixaban (ELIQUIS) 5 MG TABS tablet 180 tablet 0     Sig: Take 1 tablet by mouth 2 times daily    losartan (COZAAR) 100 MG tablet 90 tablet 0     Sig: Take 1 tablet by mouth daily     Pharmacy verified:  [facility]     90 day supply    Date of last visit: 07/22/20  Date of next visit (if applicable): 5/03/71

## 2020-09-09 RX ORDER — CARVEDILOL 6.25 MG/1
6.25 TABLET ORAL 2 TIMES DAILY
Qty: 90 TABLET | Refills: 2 | Status: SHIPPED | OUTPATIENT
Start: 2020-09-09 | End: 2021-02-02 | Stop reason: SDUPTHER

## 2020-09-09 RX ORDER — LOSARTAN POTASSIUM 100 MG/1
100 TABLET ORAL DAILY
Qty: 90 TABLET | Refills: 2 | Status: SHIPPED | OUTPATIENT
Start: 2020-09-09 | End: 2021-06-17 | Stop reason: SDUPTHER

## 2020-10-09 ENCOUNTER — HOSPITAL ENCOUNTER (OUTPATIENT)
Dept: INFUSION THERAPY | Age: 65
Discharge: HOME | End: 2020-10-09
Payer: MEDICARE

## 2020-10-09 ENCOUNTER — OFFICE VISIT (OUTPATIENT)
Dept: ONCOLOGY | Age: 65
End: 2020-10-09
Payer: MEDICARE

## 2020-10-09 VITALS
SYSTOLIC BLOOD PRESSURE: 147 MMHG | WEIGHT: 254.4 LBS | OXYGEN SATURATION: 98 % | BODY MASS INDEX: 38.55 KG/M2 | RESPIRATION RATE: 18 BRPM | TEMPERATURE: 97.3 F | HEART RATE: 78 BPM | DIASTOLIC BLOOD PRESSURE: 82 MMHG | HEIGHT: 68 IN

## 2020-10-09 PROCEDURE — 1090F PRES/ABSN URINE INCON ASSESS: CPT | Performed by: INTERNAL MEDICINE

## 2020-10-09 PROCEDURE — 99214 OFFICE O/P EST MOD 30 MIN: CPT | Performed by: INTERNAL MEDICINE

## 2020-10-09 PROCEDURE — G8427 DOCREV CUR MEDS BY ELIG CLIN: HCPCS | Performed by: INTERNAL MEDICINE

## 2020-10-09 PROCEDURE — 4040F PNEUMOC VAC/ADMIN/RCVD: CPT | Performed by: INTERNAL MEDICINE

## 2020-10-09 PROCEDURE — 1123F ACP DISCUSS/DSCN MKR DOCD: CPT | Performed by: INTERNAL MEDICINE

## 2020-10-09 PROCEDURE — G8484 FLU IMMUNIZE NO ADMIN: HCPCS | Performed by: INTERNAL MEDICINE

## 2020-10-09 PROCEDURE — G8417 CALC BMI ABV UP PARAM F/U: HCPCS | Performed by: INTERNAL MEDICINE

## 2020-10-09 PROCEDURE — 3017F COLORECTAL CA SCREEN DOC REV: CPT | Performed by: INTERNAL MEDICINE

## 2020-10-09 PROCEDURE — 1036F TOBACCO NON-USER: CPT | Performed by: INTERNAL MEDICINE

## 2020-10-09 PROCEDURE — G8399 PT W/DXA RESULTS DOCUMENT: HCPCS | Performed by: INTERNAL MEDICINE

## 2020-10-09 RX ORDER — ANASTROZOLE 1 MG/1
1 TABLET ORAL DAILY
Qty: 90 TABLET | Refills: 3 | Status: SHIPPED | OUTPATIENT
Start: 2020-10-09 | End: 2021-10-08 | Stop reason: SDUPTHER

## 2020-10-09 ASSESSMENT — ENCOUNTER SYMPTOMS
BACK PAIN: 0
RECTAL PAIN: 0
CHEST TIGHTNESS: 0
VOMITING: 0
COLOR CHANGE: 0
CONSTIPATION: 0
ABDOMINAL PAIN: 0
SORE THROAT: 0
SHORTNESS OF BREATH: 0
BLOOD IN STOOL: 0
DIARRHEA: 0
NAUSEA: 0
FACIAL SWELLING: 0
COUGH: 0
EYE DISCHARGE: 0
TROUBLE SWALLOWING: 0
ABDOMINAL DISTENTION: 0
WHEEZING: 0

## 2020-10-09 NOTE — PROGRESS NOTES
[facility] PROFESSIONAL SERVS  [facility] OF [facility]  Via Replaced by [facility]  [address] [place] [address],[address]  [address] [address]  Dept: [phone]  Dept Fax: [phone]: [phone]     Visit Date: 10/9/2020     Carole Selby is a 72 y.o. female who presents today for:   Chief Complaint   Patient presents with    Follow-up     S/P lumpectomy and sln , right breast        HPI:     This is a 80-year-old postmenopausal woman with h/o stage II A right breast cancer   Her screening mammogram on July 6, 2017 showed 1.5 cm irregular mass in the right breast lower outer quadrant for which further evaluation with ultrasound was recommended, there was also irregular lymph node in the right breast posterior depth upper region. Bilateral breast ultrasound showed a 1.9 cm x 1.6 x 1.1 cm irregular solid mass in the right breast lower outer aspect with a lobulated lymph node with focal cortex thickening in the right axillary tail and, an intermediate suspicion for malignancy. On July 10, 2017 she underwent ultrasound guided needle core biopsy of the right breast mass and lymph node. Mass biopsy showed invasive ductal carcinoma of grade 1, ER strongly positive in 100% of cell staining,  progesterone receptor positive in 20% of cells staining and HER-2 not amplified. Needle core biopsy of the right axillary lymph node showed benign lymphoid tissue. Subsequently, she underwent lumpectomy with sentinel lymph node mapping and biopsy on July 27, 2017. Final pathology report showed:  A - Lymph node, sentinel, right axillary, excision:   Negative for malignancy.   B-D - Breast, right mass, new cranial margin, and new deep margin,  excision:   Well differentiated invasive ductal carcinoma.   Rescue score:  1 of 3   Tumor size:  2.1 cm   DCIS: Present, closely associated with invasive component, nuclear  grade II.   Margins: Negative for malignancy (closest margin is 0.5 mm, skin,      invasive component).   Pathologic stage:  PT2, (sn) pN0. Oncotype DX assay showed a recurrence score of 17 translating into 11% today and risk of disease recurrence during the next 10 years. With no benefit from systemic chemotherapy the patient proceeded with post lumpectomy radiation treatment. In October 2017 she started adjuvant hormonal therapy with Arimidex    Interim history on 10/09/2020: This is 1 year follow-up visit. The patient reports that she tolerates Arimidex well. She denies having any significant side effects. Tolerable hot flashes, no arthralgia. She denies having any concerns related to her breasts. No hospitalizations since last visit with me  Her remaining 12 point review of system is negative. HPI   Past Medical History:   Diagnosis Date    Cancer [facility])     breast    Dry eye     Hypertension     Rosacea     face      Past Surgical History:   Procedure Laterality Date    BREAST BIOPSY Right 07/10/2017    women's wellness     BREAST LUMPECTOMY Right 7/27/2017    RIGHT LUMPECTOMY AND SENTINEL LYMPH NODE BIOPSY performed by Hernan Gamboa MD at [address], [address]     KNEE CARTILAGE SURGERY Right 2014      Family History   Problem Relation Age of Onset    High Blood Pressure Mother     Heart Disease Father     Breast Cancer Neg Hx     Cancer Neg Hx     Colon Cancer Neg Hx     Diabetes Neg Hx     Stroke Neg Hx       Social History     Tobacco Use    Smoking status: Never Smoker    Smokeless tobacco: Never Used   Substance Use Topics    Alcohol use: Yes     Comment: occ.       Current Outpatient Medications   Medication Sig Dispense Refill    anastrozole (ARIMIDEX) 1 MG tablet Take 1 tablet by mouth daily 90 tablet 3    losartan (COZAAR) 100 MG tablet Take 1 tablet by mouth daily 90 tablet 2    carvedilol (COREG) 6.25 MG tablet Take 1 tablet by mouth 2 times daily 90 tablet 2    apixaban (ELIQUIS) 5 MG TABS tablet Take 1 tablet by mouth 2 times daily 180 tablet 0    Multiple myalgias and neck stiffness. Skin: Negative for color change, rash and wound. Neurological: Negative for dizziness, tremors, seizures, speech difficulty, weakness, light-headedness, numbness and headaches. Hematological: Negative for adenopathy. Does not bruise/bleed easily. Psychiatric/Behavioral: Negative for confusion and sleep disturbance. The patient is not nervous/anxious. Objective:   Physical Exam   Constitutional: She is oriented to person, place, and time. She appears well-developed and well-nourished. No distress. HENT:   Head: Normocephalic. Mouth/Throat: Oropharynx is clear and moist. No oropharyngeal exudate. Eyes: Pupils are equal, round, and reactive to light. EOM are normal. Right eye exhibits no discharge. Left eye exhibits no discharge. No scleral icterus. Neck: Normal range of motion. Neck supple. No JVD present. No tracheal deviation present. No thyromegaly present. Cardiovascular: Normal rate and normal heart sounds. Exam reveals no gallop and no friction rub. No murmur heard. Pulmonary/Chest: Effort normal and breath sounds normal. No stridor. No respiratory distress. She has no wheezes. She has no rales. She exhibits no tenderness. Right breast exhibits skin change (Status post right lumpectomy. Incision nicely healed. ). Left breast exhibits no skin change. Abdominal: Soft. Bowel sounds are normal. She exhibits no distension and no mass. There is no abdominal tenderness. There is no rebound. Musculoskeletal: Normal range of motion. General: No edema. Comments: Good range of motion in all four extremities. Lymphadenopathy:     She has no cervical adenopathy. Neurological: She is alert and oriented to person, place, and time. She has normal reflexes. No cranial nerve deficit. She exhibits normal muscle tone. Skin: Skin is warm. No rash noted. No erythema. Psychiatric: She has a normal mood and affect.  Her behavior is normal. Judgment and

## 2020-12-15 NOTE — TELEPHONE ENCOUNTER
Disha Chicas called requesting a refill on the following medications:  Requested Prescriptions     Pending Prescriptions Disp Refills    apixaban (ELIQUIS) 5 MG TABS tablet 180 tablet 0     Sig: Take 1 tablet by mouth 2 times daily     Pharmacy verified:  [id number] [facility]      Date of last visit: 07-22-20  Date of next visit (if applicable): 67-37-32

## 2021-02-02 RX ORDER — CARVEDILOL 6.25 MG/1
6.25 TABLET ORAL 2 TIMES DAILY
Qty: 180 TABLET | Refills: 0 | Status: SHIPPED | OUTPATIENT
Start: 2021-02-02 | End: 2021-05-17 | Stop reason: SDUPTHER

## 2021-02-02 NOTE — TELEPHONE ENCOUNTER
Li De Leon called requesting a refill on the following medications:  Requested Prescriptions     Pending Prescriptions Disp Refills    carvedilol (COREG) 6.25 MG tablet 90 tablet 2     Sig: Take 1 tablet by mouth 2 times daily     [facility]      Date of last visit: 07/22/20  Date of next visit (if applicable): Visit date   not found      PT REQUESTING 90 DAY SUPPLY

## 2021-04-14 ENCOUNTER — OFFICE VISIT (OUTPATIENT)
Dept: CARDIOLOGY CLINIC | Age: 66
End: 2021-04-14
Payer: MEDICARE

## 2021-04-14 VITALS
BODY MASS INDEX: 34.86 KG/M2 | HEART RATE: 68 BPM | SYSTOLIC BLOOD PRESSURE: 130 MMHG | WEIGHT: 230 LBS | DIASTOLIC BLOOD PRESSURE: 78 MMHG | HEIGHT: 68 IN

## 2021-04-14 DIAGNOSIS — I48.91 ATRIAL FIBRILLATION, UNSPECIFIED TYPE (HCC): Primary | ICD-10-CM

## 2021-04-14 PROCEDURE — 4040F PNEUMOC VAC/ADMIN/RCVD: CPT | Performed by: INTERNAL MEDICINE

## 2021-04-14 PROCEDURE — 1036F TOBACCO NON-USER: CPT | Performed by: INTERNAL MEDICINE

## 2021-04-14 PROCEDURE — 3017F COLORECTAL CA SCREEN DOC REV: CPT | Performed by: INTERNAL MEDICINE

## 2021-04-14 PROCEDURE — 1123F ACP DISCUSS/DSCN MKR DOCD: CPT | Performed by: INTERNAL MEDICINE

## 2021-04-14 PROCEDURE — G8399 PT W/DXA RESULTS DOCUMENT: HCPCS | Performed by: INTERNAL MEDICINE

## 2021-04-14 PROCEDURE — G8417 CALC BMI ABV UP PARAM F/U: HCPCS | Performed by: INTERNAL MEDICINE

## 2021-04-14 PROCEDURE — 1090F PRES/ABSN URINE INCON ASSESS: CPT | Performed by: INTERNAL MEDICINE

## 2021-04-14 PROCEDURE — G8427 DOCREV CUR MEDS BY ELIG CLIN: HCPCS | Performed by: INTERNAL MEDICINE

## 2021-04-14 PROCEDURE — 99213 OFFICE O/P EST LOW 20 MIN: CPT | Performed by: INTERNAL MEDICINE

## 2021-04-14 NOTE — PROGRESS NOTES
[address] [place] [address],[place] [address] [address]  [place] [address]  Dept: [phone]  Dept Fax: [phone]  Loc: [phone]    Visit Date: 4/14/2021    Ms. Duncan Choi is a 77 y.o. female  who presented for:  Chief Complaint   Patient presents with    Check-Up    Hypertension    Atrial Fibrillation       HPI:   77year old female with a history of new-onset atrial fibrillation presents for follow-up. Denies any chest pain, shortness of breath, palpitations, lightheadedness, syncope. Wants to consider switching from eliquis. Current Outpatient Medications:     carvedilol (COREG) 6.25 MG tablet, Take 1 tablet by mouth 2 times daily, Disp: 180 tablet, Rfl: 0    apixaban (ELIQUIS) 5 MG TABS tablet, Take 1 tablet by mouth 2 times daily, Disp: 180 tablet, Rfl: 1    anastrozole (ARIMIDEX) 1 MG tablet, Take 1 tablet by mouth daily, Disp: 90 tablet, Rfl: 3    losartan (COZAAR) 100 MG tablet, Take 1 tablet by mouth daily, Disp: 90 tablet, Rfl: 2    Multiple Vitamins-Minerals (THERAPEUTIC MULTIVITAMIN-MINERALS) tablet, Take 1 tablet by mouth daily, Disp: , Rfl:     calcium carbonate 600 MG TABS tablet, Take 1 tablet by mouth daily, Disp: , Rfl:     Past Medical History  Jerrell Christensen  has a past medical history of Cancer ([address] Dry eye, Hypertension, and Rosacea. Social History  Jerrell Christensen  reports that she has never smoked. She has never used smokeless tobacco. She reports current alcohol use. She reports that she does not use drugs. Family History  Jerrell Christensen family history includes Heart Disease in her father; High Blood Pressure in her mother.     Past Surgical History   Past Surgical History:   Procedure Laterality Date    BREAST BIOPSY Right 07/10/2017    [facility]     BREAST LUMPECTOMY Right 7/27/2017    RIGHT LUMPECTOMY AND SENTINEL LYMPH NODE BIOPSY performed by Doris Moran MD at [address] [place] [address] SURGERY Right 2014       Subjective:     REVIEW OF SYSTEMS  Constitutional: denies sweats, chills and fever  HENT: denies  congestion, sinus pressure, sneezing and sore throat. Eyes: denies  pain, discharge, redness and itching. Respiratory: denies apnea, cough  Gastrointestinal: denies blood in stool, constipation, diarrhea   Endocrine: denies cold intolerance, heat intolerance, polydipsia. Genitourinary: denies dysuria, enuresis, flank pain and hematuria. Musculoskeletal: denies arthralgias, joint swelling and neck pain. Neurological: denies numbness and headaches. Psychiatric/Behavioral: denies agitation, confusion, decreased concentration and dysphoric mood    All others reviewed and are negative. Objective:     /78   Pulse 68   Ht 5' 8\" (1.727 m)   Wt 230 lb (104.3 kg)   BMI 34.97 kg/m²     Wt Readings from Last 3 Encounters:   04/14/21 230 lb (104.3 kg)   10/09/20 254 lb 6.4 oz (115.4 kg)   07/22/20 248 lb (112.5 kg)     BP Readings from Last 3 Encounters:   04/14/21 130/78   10/09/20 (!) 147/82   07/22/20 (!) 188/84       PHYSICAL EXAM  Constitutional: Oriented to person, place, and time. Appears well-developed and well-nourished. HENT:   Head: Normocephalic and atraumatic. Eyes: EOM are normal. Pupils are equal, round, and reactive to light. Neck: Normal range of motion. Neck supple. No JVD present. Cardiovascular: Normal rate , normal heart sounds and intact distal pulses. Pulmonary/Chest: Effort normal and breath sounds normal. No respiratory distress. No wheezes. No rales. Abdominal: Soft. Bowel sounds are normal. No distension. There is no tenderness. Musculoskeletal: Normal range of motion. No edema. Neurological: Alert and oriented to person, place, and time. No cranial nerve deficit. Coordination normal.   Skin: Skin is warm and dry. Psychiatric: Normal mood and affect.        No results found for: CKTOTAL, CKMB, CKMBINDEX    Lab Results   Component Value Date WBC 6.3 04/18/2019    RBC 4.65 04/18/2019    HGB 13.8 04/18/2019    HCT 41.7 04/18/2019    MCV 89.7 04/18/2019    MCH 29.7 04/18/2019    MCHC 33.1 04/18/2019     04/18/2019    MPV 9.5 04/18/2019       Lab Results   Component Value Date     04/18/2019    K 4.0 04/18/2019     04/18/2019    CO2 22 04/18/2019    BUN 15 04/18/2019    CREATININE 0.9 04/18/2019    CALCIUM 9.1 04/18/2019    LABGLOM 63 04/18/2019    GLUCOSE 126 04/18/2019       No results found for: ALKPHOS, ALT, AST, PROT, BILITOT, BILIDIR, IBILI, LABALBU    Lab Results   Component Value Date    MG 1.9 04/18/2019       Lab Results   Component Value Date    INR 1.05 04/17/2019         No results found for: LABA1C    No results found for: TRIG, HDL, LDLCALC, LDLDIRECT, LABVLDL    Lab Results   Component Value Date    TSH 3.150 04/17/2019         Testing Reviewed:      I haveindividually reviewed the below cardiac tests    EKG:    ECHO:   Results for orders placed during the hospital encounter of 04/17/19   Echocardiogram 2D/ M-Mode/ Colorflow/ Do    Narrative Transthoracic Echocardiography Report (TTE)     Demographics      Patient Name   Nadeen Torres  Gender              Female                  B      MR #           [id number]      Race                                                    Ethnicity      Account #      [de-identified]      Room Number         0037      Accession      [id number]      Date of Study       04/18/2019   Number      Date of Birth  1955     Referring Physician Angélica Cardenas DO      Age            59 year(s)     Sonographer         Trip Shah, RDCS,                                                     RVT                                    Interpreting        Shakeel Long MD                                 Physician     Procedure    Type of Study      TTE procedure:ECHOCARDIOGRAM COMPLETE 2D W DOPPLER W COLOR.      Procedure Date Date: 04/18/2019 Start: 09:34 AM    Study Location: Bedside  Technical Quality: Adequate visualization    Indications:New onset atrial fibrillation. Additional Medical History:hypertension, history of breast cancer    Patient Status: Routine    Height: 68 inches Weight: 247.01 pounds BSA: 2.24 m^2 BMI: 37.56 kg/m^2    BP: 112/57 mmHg     Conclusions      Summary   Left ventricular size and systolic function is normal. Ejection fraction   was estimated at 55-60%. LV wall thickness is within normal limits and   there are no obvious wall motion abnormalities. The right ventricular size appears normal with normal systolic function   and wall thickness. Signature      ----------------------------------------------------------------   Electronically signed by Leonor Chiang MD (Interpreting   physician) on 04/18/2019 at 06:32 PM   ----------------------------------------------------------------      Findings      Mitral Valve   The mitral valve structure is normal with normal leaflet separation. DOPPLER: The transmitral velocity was within the normal range with no   evidence for mitral stenosis. There was no evidence of mitral   regurgitation. Aortic Valve   The aortic valve appears trileaflet with normal thickness and leaflet   excursion. DOPPLER: Transaortic velocity was within the normal range with   no evidence of aortic stenosis. There was no evidence of aortic   regurgitation. Tricuspid Valve   The tricuspid valve structure is normal with normal leaflet separation. DOPPLER: There is no evidence of tricuspid stenosis. There was no evidence   of tricuspid regurgitation. Pulmonic Valve   The pulmonic valve leaflets appear normal thickness, and normal cuspal   separation. DOPPLER: The transpulmonic velocity was within the normal   range. No evidence for regurgitation.       Left Atrium   Left atrial size is normal.      Left Ventricle   Left ventricular size and systolic function is normal. Ejection fraction   was estimated at 55-60%. LV wall thickness is within normal limits and   there are no obvious wall motion abnormalities. Right Atrium   Right atrial size was normal.      Right Ventricle   The right ventricular size appears normal with normal systolic function   and wall thickness. Pericardial Effusion   The pericardium appears normal with no evidence of a pericardial effusion. Pleural Effusion   No evidence of pleural effusion. Aorta / Great Vessels   -Aortic root dimension within normal limits. -IVC size is within normal limits with normal respiratory phasic changes.      M-Mode/2D Measurements & Calculations      LV Diastolic    LV Systolic Dimension:    AV Cusp Separation: 1.9 cmLA   Dimension: 4.7  3.2 cm                    Dimension: 3.7 cmAO Root   cm              LV Volume Diastolic: 632  Dimension: 2.4 cmLA Area: 17.4   LV FS:31.9 %    ml                        cm^2   LV PW           LV Volume Systolic: 41 ml   Diastolic: 1 cm LV EDV/LV EDV Index: 102   Septum          ml/46 m^2LV ESV/LV ESV   Diastolic: 0.8  Index: 41 IR/54 m^2       RV Diastolic Dimension: 2.2 cm   cm              EF Calculated: 59.8 %                                             LA/Aorta: 1.54                                                LA volume/Index: 53.1 ml /24m^2     Doppler Measurements & Calculations      MV Peak E-Wave: 92.7 cm/s  AV Peak Velocity: 143 LVOT Peak Velocity: 91.3   MV Peak A-Wave: 105 cm/s   cm/s                  cm/s   MV E/A Ratio: 0.88         AV Peak Gradient:     LVOT Peak Gradient: 3   MV Peak Gradient: 3.44     8.18 mmHg             mmHg   mmHg                                                    TV Peak E-Wave: 50.1 cm/s   MV Deceleration Time: 306                        TV Peak A-Wave: 39.2 cm/s   msec                                                    TV Peak Gradient: 1 mmHg                                                    TR Velocity:219 cm/s   MV E' Septal Velocity: 7                         TR Gradient:19.18 mmHg   cm/s                       AV DVI (Vmax):0.64    PV Peak Velocity: 108   MV A' Septal Velocity:                           cm/s   10.4 cm/s                                        PV Peak Gradient: 4.67   MV E' Lateral Velocity:                          mmHg   8.2 cm/s   MV A' Lateral Velocity:   11.9 cm/s   E/E' septal: 13.24   E/E' lateral: 11.3     [id number]       STRESS:    CATH:    Assessment/Plan       Diagnosis Orders   1. Atrial fibrillation, unspecified type ([facility] [address]       Afib [id number]: 2  HTN    On eliquis  [id number]:2  No bleeding issues  On Coreg  Advised to monitor BP at home   Continue rest of the management  The patient is asked to make an attempt to improve diet and exercise patterns to aid in medical management of this problem. Advised more plant based nutrition/meditarrean diet   Advised patient to call office or seek immediate medical attention if there is any new onset of  any chest pain, sob, palpitations, lightheadedness, dizziness, orthopnea, PND or pedal edema. All medication side effects were discussed in details. Thank youfor allowing me to participate in the care of this patient. Please do not hesitate to contact me for any further questions. Return in about 1 year (around 4/14/2022), or if symptoms worsen or fail to improve, for Regular follow up, Review testing.        Electronically signed by Haim Black MD [facility]  4/14/2021 at 3:25 PM

## 2021-05-17 RX ORDER — CARVEDILOL 6.25 MG/1
6.25 TABLET ORAL 2 TIMES DAILY
Qty: 180 TABLET | Refills: 0 | Status: SHIPPED | OUTPATIENT
Start: 2021-05-17 | End: 2021-08-17 | Stop reason: SDUPTHER

## 2021-05-17 NOTE — TELEPHONE ENCOUNTER
Shahram a called requesting a refill on the following medications:  Requested Prescriptions     Pending Prescriptions Disp Refills    carvedilol (COREG) 6.25 MG tablet 180 tablet 0     Sig: Take 1 tablet by mouth 2 times daily     Pharmacy verified:  .  [facility] in [address] [place] [address]    Date of last visit: 04/14/2021  Date of next visit (if applicable): 24/26/6434

## 2021-05-25 ENCOUNTER — HOSPITAL ENCOUNTER (OUTPATIENT)
Dept: MAMMOGRAPHY | Age: 66
Discharge: HOME | End: 2021-05-25
Payer: MEDICARE

## 2021-05-25 DIAGNOSIS — Z12.31 SCREENING MAMMOGRAM FOR HIGH-RISK PATIENT: ICD-10-CM

## 2021-05-25 PROCEDURE — 77067 SCR MAMMO BI INCL CAD: CPT

## 2021-06-02 ENCOUNTER — HOSPITAL ENCOUNTER (OUTPATIENT)
Dept: RADIATION ONCOLOGY | Age: 66
Discharge: HOME | End: 2021-06-02
Payer: MEDICARE

## 2021-06-02 VITALS
BODY MASS INDEX: 34.79 KG/M2 | HEART RATE: 71 BPM | SYSTOLIC BLOOD PRESSURE: 168 MMHG | DIASTOLIC BLOOD PRESSURE: 79 MMHG | OXYGEN SATURATION: 98 % | TEMPERATURE: 97.2 F | WEIGHT: 228.84 LBS | RESPIRATION RATE: 20 BRPM

## 2021-06-02 DIAGNOSIS — Z78.0 POSTMENOPAUSAL: Primary | ICD-10-CM

## 2021-06-02 PROCEDURE — 99215 OFFICE O/P EST HI 40 MIN: CPT | Performed by: NURSE PRACTITIONER

## 2021-06-02 PROCEDURE — 99212 OFFICE O/P EST SF 10 MIN: CPT | Performed by: NURSE PRACTITIONER

## 2021-06-02 RX ORDER — METHYLPREDNISOLONE 4 MG/1
TABLET ORAL
Qty: 1 KIT | Refills: 0 | Status: SHIPPED | OUTPATIENT
Start: 2021-06-02 | End: 2021-06-08

## 2021-06-02 NOTE — PROGRESS NOTES
[facility], [address]  Phone: [phone]   Toll Free: [phone]   Fax: [phone]    RADIATION ONCOLOGY FOLLOW UP REPORT    PATIENT NAME:  Shahram Flynn     : 1955  MEDICAL RECORD NO: [id number]    LOCATION: [facility] NO: [id number]      PROVIDER: SAIMA Duncan CNP        DATE OF SERVICE: 2021    FOLLOW UP PHYSICIANS: Dr. Nini Leyva, Dr. Ewelina Bean -- well-differentiated invasive ductal carcinoma of the right lower outer quadrant female breast with associated grade 2 DCIS,pT2 N0 M0, Stage IIA, ER+, SD+, Her2-.     DATE OF DIAGNOSIS: 7/10/2017     END OF TREATMENT DATE: 10/10/2017     ECOG PERFORMANCE STATUS: 0     PAIN: Denies     CHAPERONE: Declined        HPI: Sonya sought evaluation for palpable right breast nodule. She underwent diagnostic right mammogram and ultrasound on 2017.  The showed an irregular 1.5 cm mass in the right breast lower outer aspect posterior depth.  Biopsy confirmed the presence of well differentiated invasive ductal carcinoma as described above.  She discussed her treatment options during her surgical consultation and elected to undergo breast conservation surgery.  She underwent lumpectomy and sentinel lymph node biopsy 2017.  Final pathology showed a 2.1 cm invasive cancer with associated DCIS and clear surgical margins.  The sentinel lymph node was negative. Bri Sood has been seen in hematology oncology and Oncotype DX score was 17 placing her in the low risk category.  She received a recommendation for radiation therapy after discussion regarding the role of radiation therapy in the management of her breast cancer she was agreeable to proceed with XRT. Manda Martinez experienced a increase in fatigue, mild breast tenderness and moderate skin erythema during her treatment.       INTERVAL HISTORY: Manda Martinez returns to [facility] a post treatment check up after undergoing surgery and radiation therapy for breast cancer. Mick Bacon denies any changes in her health condition since her last visit. She denies complaints related to her radiation treatment at this time. Javier Pimentel states her hot flashes remain unchanged from the last visit,  they are less frequent and not as intense as they were when she first started taking Arimidex. She states she noticed a rash on her arms, legs and under her breast develop last week. She states it does not itch or hurt. She has used cortisone which helped some. She denies fatigue, chest pain, sob, nausea, vomiting, breast pain or swelling, loss of right upper extremity ROM, arm swelling, unintentional weight loss, loss of appetite, changes in bowel or bladder habits. LAB RESULTS:   No recent labs    RADIOLOGY RESULTS:   5/25/21: Bilateral screening mammogram:   Impression   1. No mammographic evidence of malignancy. A 1 year screening mammogram is recommended.       A result letter will be sent to the patient. Jorge Duarte will also receive a reminder 1 month prior to her next mammogram.       BI-RADS CATEGORY 2: BENIGN FINDINGS.       Management Recommendation: Routine annual mammography. MEDICATIONS:   Current Outpatient Medications   Medication Sig Dispense Refill    carvedilol (COREG) 6.25 MG tablet Take 1 tablet by mouth 2 times daily 180 tablet 0    apixaban (ELIQUIS) 5 MG TABS tablet Take 1 tablet by mouth 2 times daily 180 tablet 3    anastrozole (ARIMIDEX) 1 MG tablet Take 1 tablet by mouth daily 90 tablet 3    losartan (COZAAR) 100 MG tablet Take 1 tablet by mouth daily 90 tablet 2    Multiple Vitamins-Minerals (THERAPEUTIC MULTIVITAMIN-MINERALS) tablet Take 1 tablet by mouth daily      calcium carbonate 600 MG TABS tablet Take 1 tablet by mouth daily       No current facility-administered medications for this encounter. ROS: As noted in the HPI and Interval history, otherwise negative. EXAMINATION:   GENERAL: Susana Maldonado is a pleasant well-developed adult female. Mil Nunez is alert and oriented ×3 in no acute distress. VITAL SIGNS:  Weight 228 pounds, temperature 97.2°F, pulse 71, blood pressure 168/79, respirations 20, pulse ox 98% room air. HEENT: Normocephalic, atraumatic.  PERRL.  EOMI.  Ears, nose and lips are within normal limits.  Oropharynx is unremarkable  NECK: Without palpable cervical adenopathy  LYMPH: Without palpable supraclavicular or axillary adenopathy  LUNGS: Clear no adventitious sounds. Respirations easy and unlabored.   HEART: Regular rate and rhythm without murmur  BREASTS: Left breast is soft, nontender.  There are no suspicious masses, nodules or architectural distortions. Nipple is everted without discharge.  Right breast is soft and nontender.  Surgical incision sites are well-healed without signs of infection. There is no evidence of breast lymphedema at this time. Candace Guise are no suspicious masses or lumps. Nipple is everted without discharge. There is a red slightly raised rash noted under bilateral breasts. ABDOMEN: Soft, nontender.  Active bowel sounds ×4. EXTREMITIES:  Without clubbing or cyanosis.  Full range of motion the bilateral upper extremities no evidence extremity lymphedema. Red slightly raised rash bilateral upper and lower extremities. NEUROLOGIC EXAMINATION: Cranial nerves grossly intact        ASSESSMENT AND PLAN: Sonya was diagnosed with Stage IIA hormone responsive right breast cancer in 2017. She underwent treatment with surgery, radiation and antihormonal treatment. She continues on antihormonal treatment with Arimidex. Mammogram in May showed benign findings. Armidex tolerating with mild hot flashes. Red slightly raised red rash bilateral arms and legs and under bilateral breasts. Will send in medrol dose pack for her. If this does not help encouraged her to follow up with PCP.  She has not concerning findings on today's physical exam with regards to recurrent disease. PLAN:  1. Mammogram: 5/2021 showed benign findings. 2. Arimidex: tolerating with mild hot flashes. 3. DEXA: completed 11/2019 Due again in November 2021. Last scan showed findings WNL. 4. Continue follow up with other providers as scheduled. 5. Follow up here in April to offset appointments with Dr. Everett Bullock. 10. Betoeliseo Hays is aware she can call for questions, concerns or changes in condition. Electronically signed by SAIMA Sky CNP on 6/2/21 at 10:45 AM EDT    ATTESTATION:  I have spent 45 minutes reviewing previous notes, test results and face to face with the patient discussing the diagnosis and importance of compliance with the treatment plan as well as documenting on the day of the visit.     CC: Dr. Everett Bullock, Dr. Edward Junior

## 2021-06-17 RX ORDER — LOSARTAN POTASSIUM 100 MG/1
100 TABLET ORAL DAILY
Qty: 90 TABLET | Refills: 2 | Status: SHIPPED | OUTPATIENT
Start: 2021-06-17 | End: 2022-03-29 | Stop reason: SDUPTHER

## 2021-06-17 NOTE — TELEPHONE ENCOUNTER
Juani Burciaga called requesting a refill on the following medications:  Requested Prescriptions     Pending Prescriptions Disp Refills    apixaban (ELIQUIS) 5 MG TABS tablet 180 tablet 3     Sig: Take 1 tablet by mouth 2 times daily    losartan (COZAAR) 100 MG tablet 90 tablet 2     Sig: Take 1 tablet by mouth daily     Pharmacy verified:[facility]  . pv      Date of last visit:   Date of next visit (if applicable): Visit date not found

## 2021-08-03 ENCOUNTER — NURSE ONLY (OUTPATIENT)
Dept: LAB | Age: 66
End: 2021-08-03

## 2021-08-03 DIAGNOSIS — Z11.59 NEED FOR HEPATITIS C SCREENING TEST: ICD-10-CM

## 2021-08-03 DIAGNOSIS — Z00.00 MEDICARE ANNUAL WELLNESS VISIT, INITIAL: ICD-10-CM

## 2021-08-03 DIAGNOSIS — R73.9 HYPERGLYCEMIA: ICD-10-CM

## 2021-08-03 DIAGNOSIS — R81 GLUCOSURIA: ICD-10-CM

## 2021-08-04 LAB
ALBUMIN SERPL-MCNC: 4.6 G/DL (ref 3.5–5.1)
ALP BLD-CCNC: 119 U/L (ref 38–126)
ALT SERPL-CCNC: 21 U/L (ref 11–66)
ANION GAP SERPL CALCULATED.3IONS-SCNC: 11 MEQ/L (ref 8–16)
AST SERPL-CCNC: 14 U/L (ref 5–40)
AVERAGE GLUCOSE: 276 MG/DL (ref 70–126)
BASOPHILS # BLD: 0.9 %
BASOPHILS ABSOLUTE: 0.1 THOU/MM3 (ref 0–0.1)
BILIRUB SERPL-MCNC: 1.2 MG/DL (ref 0.3–1.2)
BUN BLDV-MCNC: 14 MG/DL (ref 7–22)
CALCIUM SERPL-MCNC: 9.6 MG/DL (ref 8.5–10.5)
CHLORIDE BLD-SCNC: 101 MEQ/L (ref 98–111)
CHOLESTEROL, TOTAL: 123 MG/DL (ref 100–199)
CO2: 26 MEQ/L (ref 23–33)
CREAT SERPL-MCNC: 0.8 MG/DL (ref 0.4–1.2)
EOSINOPHIL # BLD: 1.7 %
EOSINOPHILS ABSOLUTE: 0.1 THOU/MM3 (ref 0–0.4)
ERYTHROCYTE [DISTWIDTH] IN BLOOD BY AUTOMATED COUNT: 13.6 % (ref 11.5–14.5)
ERYTHROCYTE [DISTWIDTH] IN BLOOD BY AUTOMATED COUNT: 46.4 FL (ref 35–45)
GFR SERPL CREATININE-BSD FRML MDRD: 72 ML/MIN/1.73M2
GLUCOSE BLD-MCNC: 293 MG/DL (ref 70–108)
HBA1C MFR BLD: 11.2 % (ref 4.4–6.4)
HCT VFR BLD CALC: 47.6 % (ref 37–47)
HDLC SERPL-MCNC: 80 MG/DL
HEMOGLOBIN: 15 GM/DL (ref 12–16)
HEPATITIS C ANTIBODY: NEGATIVE
IMMATURE GRANS (ABS): 0.04 THOU/MM3 (ref 0–0.07)
IMMATURE GRANULOCYTES: 0.6 %
LDL CHOLESTEROL CALCULATED: 36 MG/DL
LYMPHOCYTES # BLD: 24 %
LYMPHOCYTES ABSOLUTE: 1.7 THOU/MM3 (ref 1–4.8)
MCH RBC QN AUTO: 29.6 PG (ref 26–33)
MCHC RBC AUTO-ENTMCNC: 31.5 GM/DL (ref 32.2–35.5)
MCV RBC AUTO: 93.9 FL (ref 81–99)
MONOCYTES # BLD: 8.5 %
MONOCYTES ABSOLUTE: 0.6 THOU/MM3 (ref 0.4–1.3)
NUCLEATED RED BLOOD CELLS: 0 /100 WBC
PLATELET # BLD: 226 THOU/MM3 (ref 130–400)
PMV BLD AUTO: 11 FL (ref 9.4–12.4)
POTASSIUM SERPL-SCNC: 5.5 MEQ/L (ref 3.5–5.2)
RBC # BLD: 5.07 MILL/MM3 (ref 4.2–5.4)
SEG NEUTROPHILS: 64.3 %
SEGMENTED NEUTROPHILS ABSOLUTE COUNT: 4.4 THOU/MM3 (ref 1.8–7.7)
SODIUM BLD-SCNC: 138 MEQ/L (ref 135–145)
TOTAL PROTEIN: 7.2 G/DL (ref 6.1–8)
TRIGL SERPL-MCNC: 34 MG/DL (ref 0–199)
WBC # BLD: 6.9 THOU/MM3 (ref 4.8–10.8)

## 2021-08-16 PROBLEM — E11.65 TYPE 2 DIABETES MELLITUS WITH HYPERGLYCEMIA (HCC): Status: ACTIVE | Noted: 2021-08-16

## 2021-08-17 NOTE — TELEPHONE ENCOUNTER
Vilma Avitia called requesting a refill on the following medications:  Requested Prescriptions     Pending Prescriptions Disp Refills    carvedilol (COREG) 6.25 MG tablet 180 tablet 0     Sig: Take 1 tablet by mouth 2 times daily     [facility]    Date of last visit: 04/14/2021  Date of next visit (if applicable): 38/13/8007

## 2021-08-18 RX ORDER — CARVEDILOL 6.25 MG/1
6.25 TABLET ORAL 2 TIMES DAILY
Qty: 180 TABLET | Refills: 2 | Status: SHIPPED | OUTPATIENT
Start: 2021-08-18 | End: 2022-04-20 | Stop reason: SDUPTHER

## 2021-10-08 ENCOUNTER — HOSPITAL ENCOUNTER (OUTPATIENT)
Dept: INFUSION THERAPY | Age: 66
Discharge: HOME | End: 2021-10-08
Payer: MEDICARE

## 2021-10-08 ENCOUNTER — OFFICE VISIT (OUTPATIENT)
Dept: ONCOLOGY | Age: 66
End: 2021-10-08
Payer: MEDICARE

## 2021-10-08 VITALS
WEIGHT: 222.6 LBS | TEMPERATURE: 98.5 F | DIASTOLIC BLOOD PRESSURE: 71 MMHG | RESPIRATION RATE: 18 BRPM | HEIGHT: 67 IN | BODY MASS INDEX: 34.94 KG/M2 | HEART RATE: 66 BPM | SYSTOLIC BLOOD PRESSURE: 132 MMHG | OXYGEN SATURATION: 97 %

## 2021-10-08 DIAGNOSIS — Z98.890 S/P LUMPECTOMY, RIGHT BREAST: ICD-10-CM

## 2021-10-08 DIAGNOSIS — C50.511 MALIGNANT NEOPLASM OF LOWER-OUTER QUADRANT OF RIGHT BREAST OF FEMALE, ESTROGEN RECEPTOR POSITIVE (HCC): Primary | ICD-10-CM

## 2021-10-08 DIAGNOSIS — Z79.811 PROPHYLACTIC USE OF ANASTROZOLE (ARIMIDEX): ICD-10-CM

## 2021-10-08 DIAGNOSIS — Z17.0 MALIGNANT NEOPLASM OF LOWER-OUTER QUADRANT OF RIGHT BREAST OF FEMALE, ESTROGEN RECEPTOR POSITIVE (HCC): Primary | ICD-10-CM

## 2021-10-08 DIAGNOSIS — Z85.3 ENCOUNTER FOR FOLLOW-UP SURVEILLANCE OF BREAST CANCER: ICD-10-CM

## 2021-10-08 DIAGNOSIS — Z08 ENCOUNTER FOR FOLLOW-UP SURVEILLANCE OF BREAST CANCER: ICD-10-CM

## 2021-10-08 PROCEDURE — 99211 OFF/OP EST MAY X REQ PHY/QHP: CPT

## 2021-10-08 PROCEDURE — 1123F ACP DISCUSS/DSCN MKR DOCD: CPT | Performed by: INTERNAL MEDICINE

## 2021-10-08 PROCEDURE — G8399 PT W/DXA RESULTS DOCUMENT: HCPCS | Performed by: INTERNAL MEDICINE

## 2021-10-08 PROCEDURE — G8484 FLU IMMUNIZE NO ADMIN: HCPCS | Performed by: INTERNAL MEDICINE

## 2021-10-08 PROCEDURE — 1036F TOBACCO NON-USER: CPT | Performed by: INTERNAL MEDICINE

## 2021-10-08 PROCEDURE — G8417 CALC BMI ABV UP PARAM F/U: HCPCS | Performed by: INTERNAL MEDICINE

## 2021-10-08 PROCEDURE — 3017F COLORECTAL CA SCREEN DOC REV: CPT | Performed by: INTERNAL MEDICINE

## 2021-10-08 PROCEDURE — 4040F PNEUMOC VAC/ADMIN/RCVD: CPT | Performed by: INTERNAL MEDICINE

## 2021-10-08 PROCEDURE — 1090F PRES/ABSN URINE INCON ASSESS: CPT | Performed by: INTERNAL MEDICINE

## 2021-10-08 PROCEDURE — G8427 DOCREV CUR MEDS BY ELIG CLIN: HCPCS | Performed by: INTERNAL MEDICINE

## 2021-10-08 PROCEDURE — 99214 OFFICE O/P EST MOD 30 MIN: CPT | Performed by: INTERNAL MEDICINE

## 2021-10-08 RX ORDER — ANASTROZOLE 1 MG/1
1 TABLET ORAL DAILY
Qty: 90 TABLET | Refills: 3 | Status: SHIPPED | OUTPATIENT
Start: 2021-10-08 | End: 2022-09-30 | Stop reason: ALTCHOICE

## 2021-10-08 ASSESSMENT — ENCOUNTER SYMPTOMS
RECTAL PAIN: 0
SORE THROAT: 0
SHORTNESS OF BREATH: 0
ABDOMINAL PAIN: 0
DIARRHEA: 0
BACK PAIN: 0
COUGH: 0
CONSTIPATION: 0
TROUBLE SWALLOWING: 0
EYE DISCHARGE: 0
CHEST TIGHTNESS: 0
FACIAL SWELLING: 0
WHEEZING: 0
COLOR CHANGE: 0
BLOOD IN STOOL: 0
ABDOMINAL DISTENTION: 0
VOMITING: 0
NAUSEA: 0

## 2021-10-08 NOTE — PROGRESS NOTES
[facility] PROFESSIONAL SERVS  [facility]  [facility] [address]  [address] [facility] [address]  [address] [address]  Dept: [phone]  Dept Fax: [phone]: [phone]     Visit Date: 10/8/2021     Abby Mcdonald is a 77 y.o. female who presents today for:   Chief Complaint   Patient presents with    Follow-up     Malignant neoplasm of lower-outer quadrant of right breast of female, estrogen receptor positive         HPI:     This is a 59-year-old postmenopausal woman with h/o stage II A right breast cancer   Her screening mammogram on July 6, 2017 showed 1.5 cm irregular mass in the right breast lower outer quadrant for which further evaluation with ultrasound was recommended, there was also irregular lymph node in the right breast posterior depth upper region. Bilateral breast ultrasound showed a 1.9 cm x 1.6 x 1.1 cm irregular solid mass in the right breast lower outer aspect with a lobulated lymph node with focal cortex thickening in the right axillary tail and, an intermediate suspicion for malignancy. On July 10, 2017 she underwent ultrasound guided needle core biopsy of the right breast mass and lymph node. Mass biopsy showed invasive ductal carcinoma of grade 1, ER strongly positive in 100% of cell staining,  progesterone receptor positive in 20% of cells staining and HER-2 not amplified. Needle core biopsy of the right axillary lymph node showed benign lymphoid tissue. Subsequently, she underwent lumpectomy with sentinel lymph node mapping and biopsy on July 27, 2017. Final pathology report showed:  A - Lymph node, sentinel, right axillary, excision:   Negative for malignancy.   B-D - Breast, right mass, new cranial margin, and new deep margin,  excision:   Well differentiated invasive ductal carcinoma.   [place] score:  1 of 3   Tumor size:  2.1 cm   DCIS: Present, closely associated with invasive component, nuclear  grade II.   Margins: Negative for malignancy (closest margin is 0.5 mm, skin,      invasive component).   Pathologic stage:  PT2, (sn) pN0. Oncotype DX assay showed a recurrence score of 17 translating into 11% today and risk of disease recurrence during the next 10 years. With no benefit from systemic chemotherapy the patient proceeded with post lumpectomy radiation treatment. In October 2017 she started adjuvant hormonal therapy with Arimidex    Interim history on 10/08/2021: This is 1 year follow-up visit. The patient reports that she tolerates Arimidex well. She denies having any significant side effects. Tolerable hot flashes, no arthralgia. She denies having any concerns related to her breasts. No hospitalizations since last visit with me  Her remaining 12 point review of system is negative. HPI   Past Medical History:   Diagnosis Date    Breast cancer ([address] 2017    Lumpectomy    Cancer ([facility] [address]     breast    Diabetes mellitus ([facility] [address]     Dry eye     History of therapeutic radiation 2017    Hypertension     Rosacea     face      Past Surgical History:   Procedure Laterality Date    BREAST BIOPSY Right 07/10/2017    women's wellness     BREAST LUMPECTOMY Right 7/27/2017    RIGHT LUMPECTOMY AND SENTINEL LYMPH NODE BIOPSY performed by Eda Weller MD at [address] [facility] SURGERY Right 2014    OVARY REMOVAL Bilateral 1987      Family History   Problem Relation Age of Onset    High Blood Pressure Mother     Heart Disease Father     Breast Cancer Sister 77    Cancer Neg Hx     Colon Cancer Neg Hx     Diabetes Neg Hx     Stroke Neg Hx       Social History     Tobacco Use    Smoking status: Never Smoker    Smokeless tobacco: Never Used   Substance Use Topics    Alcohol use: Yes     Comment: occ.       Current Outpatient Medications   Medication Sig Dispense Refill    anastrozole (ARIMIDEX) 1 MG tablet Take 1 tablet by mouth daily 90 tablet 3    dapagliflozin (FARXIGA) 10 MG tablet Take 1 tablet by mouth every morning 90 tablet 1    metFORMIN (GLUCOPHAGE-XR) 500 MG extended release tablet Take 2 tablets by mouth 2 times daily Start with 1 tablet once daily in am x 5 days, then 1 tablet bid. 360 tablet 0    Blood Glucose Monitoring Suppl (FREESTYLE LITE) NONA 1 Device by Does not apply route daily 1 Device 0    Lancets Ultra Fine MISC Inject 1 Device into the skin 2 times daily 100 each 3    carvedilol (COREG) 6.25 MG tablet Take 1 tablet by mouth 2 times daily 180 tablet 2    nystatin (MYCOSTATIN) 817976 UNIT/GM powder Apply topically 3 times daily as needed (dermatitis under breasts) 1 Bottle 3    apixaban (ELIQUIS) 5 MG TABS tablet Take 1 tablet by mouth 2 times daily 180 tablet 2    losartan (COZAAR) 100 MG tablet Take 1 tablet by mouth daily 90 tablet 2    Multiple Vitamins-Minerals (THERAPEUTIC MULTIVITAMIN-MINERALS) tablet Take 1 tablet by mouth daily      calcium carbonate 600 MG TABS tablet Take 1 tablet by mouth daily       No current facility-administered medications for this visit.       Allergies   Allergen Reactions    Tape Sherolyn Pott Tape] Rash      Health Maintenance   Topic Date Due    Diabetic retinal exam  Never done    Diabetic microalbuminuria test  Never done    Shingles Vaccine (1 of 2) Never done    Flu vaccine (1) 09/01/2021    COVID-19 Vaccine (3 - Pfizer booster) 10/09/2021    A1C test (Diabetic or Prediabetic)  11/03/2021    DTaP/Tdap/Td vaccine (2 - Td or Tdap) 02/10/2022    Breast cancer screen  05/25/2022    Lipid screen  08/03/2022    Annual Wellness Visit (AWV)  08/03/2022    Potassium monitoring  08/03/2022    Creatinine monitoring  08/03/2022    Diabetic foot exam  08/16/2022    Colon cancer screen colonoscopy  03/04/2025    DEXA (modify frequency per FRAX score)  Completed    Pneumococcal 65+ years Vaccine  Completed    Hepatitis C screen  Completed    Hepatitis A vaccine  Aged Out    Hib vaccine  Aged Out    Meningococcal (ACWY) vaccine  Aged Out Subjective:   Review of Systems   Constitutional: Negative for activity change, appetite change, fatigue and fever. HENT: Negative for congestion, dental problem, facial swelling, hearing loss, mouth sores, nosebleeds, sore throat, tinnitus and trouble swallowing. Eyes: Negative for discharge and visual disturbance. Respiratory: Negative for cough, chest tightness, shortness of breath and wheezing. Cardiovascular: Negative for chest pain, palpitations and leg swelling. Gastrointestinal: Negative for abdominal distention, abdominal pain, blood in stool, constipation, diarrhea, nausea, rectal pain and vomiting. Endocrine: Negative for cold intolerance, polydipsia and polyuria. Genitourinary: Negative for decreased urine volume, difficulty urinating, dysuria, flank pain, hematuria and urgency. Musculoskeletal: Negative for arthralgias, back pain, gait problem, joint swelling, myalgias and neck stiffness. Skin: Negative for color change, rash and wound. Neurological: Negative for dizziness, tremors, seizures, speech difficulty, weakness, light-headedness, numbness and headaches. Hematological: Negative for adenopathy. Does not bruise/bleed easily. Psychiatric/Behavioral: Negative for confusion and sleep disturbance. The patient is not nervous/anxious. Objective:   Physical Exam  Vitals reviewed. Constitutional:       General: She is not in acute distress. Appearance: She is well-developed. HENT:      Head: Normocephalic. Mouth/Throat:      Pharynx: No oropharyngeal exudate. Eyes:      General: No scleral icterus. Right eye: No discharge. Left eye: No discharge. Pupils: Pupils are equal, round, and reactive to light. Neck:      Thyroid: No thyromegaly. Vascular: No JVD. Trachea: No tracheal deviation. Cardiovascular:      Rate and Rhythm: Normal rate. Heart sounds: Normal heart sounds. No murmur heard. No friction rub.  No gallop. Pulmonary:      Effort: Pulmonary effort is normal. No respiratory distress. Breath sounds: Normal breath sounds. No stridor. No wheezing or rales. Chest:      Chest wall: No tenderness. Breasts:         Right: Skin change (Status post right lumpectomy. Incision nicely healed.) present. Left: No skin change. Abdominal:      General: Bowel sounds are normal. There is no distension. Palpations: Abdomen is soft. There is no mass. Tenderness: There is no abdominal tenderness. There is no rebound. Musculoskeletal:         General: Normal range of motion. Cervical back: Normal range of motion and neck supple. Comments: Good range of motion in all four extremities. Lymphadenopathy:      Cervical: No cervical adenopathy. Skin:     General: Skin is warm. Findings: No erythema or rash. Neurological:      Mental Status: She is alert and oriented to person, place, and time. Cranial Nerves: No cranial nerve deficit. Motor: No abnormal muscle tone. Deep Tendon Reflexes: Reflexes are normal and symmetric. Psychiatric:         Behavior: Behavior normal.         Thought Content: Thought content normal.         Judgment: Judgment normal.        /71 (Site: Left Upper Arm, Position: Sitting, Cuff Size: Medium Adult)   Pulse 66   Temp 98.5 °F (36.9 °C) (Oral)   Resp 18   Ht 5' 7\" (1.702 m)   Wt 222 lb 9.6 oz (101 kg)   SpO2 97%   BMI 34.86 kg/m²      Imaging studies and labs:     DEXA study in November 2019 showed:  IMPRESSION: BONE DENSITY WITHIN NORMAL LIMITS        Mammogram in May 2021 showed benign findings BI-RADS Category 2    Assessment:   1. Stage II A, hormone responsive, HER-2 negative right breast cancer. Status post right breast lumpectomy and sentinel lymph node biopsy. Oncotype DX assay showed RS was 17, placing her into low risk disease.    Status post adjuvant radiation treatment to the right breast.  Adjuvant hormonal therapy was Arimidex started in October 2017.  2.  Adjuvant hormonal therapy. The patient tolerates Arimidex well, no major side effects. Her physical examination is without evidence of disease recurrence. The patient will continue Arimidex. She had the DEXA study in November 2019 that showed normal bone density. Her mammogram in May 2021 showed benign findings. 3.Breast cancer surveillance. . Cancer surveillance. Management of breast cancer survivors who have completed active treatment and have no evidence of disease are cancer surveillance, lifestyle modifications including pursuing healthy regular exercise program, minimizing alcohol intake, and refraining from smoking. Survivor follow-up will include updated history, regular physical examination. Radiologic imaging to screen for distant recurrence will be not performed unless the patient will develop new symptoms. Symptoms suspicious for recurrent /metastic disease include:  ?Constitutional symptoms - Anorexia, weight loss, malaise, fatigue, insomnia. ? Bone pain  ? Pulmonary symptoms - persistent cough or dyspnea (at rest or with exertion). ?Neurologic symptoms - Headache, nausea, vomiting, confusion, weakness, numbness or tingling. ?Gastrointestinal symptoms - Right upper quadrant pain, change in bowel habits, presence of bloody or tarry stools. Diagnosis Orders   1. Malignant neoplasm of lower-outer quadrant of right breast of female, estrogen receptor positive ([facility] [place] [address]     2. S/P lumpectomy and sln , right breast     3. Prophylactic use of anastrozole (Arimidex)     4. Encounter for follow-up surveillance of breast cancer          Plan:   Return in about 53 weeks (around 10/14/2022). Orders Placed:   No orders of the defined types were placed in this encounter.        Medications Prescribed:   Orders Placed This Encounter   Medications    anastrozole (ARIMIDEX) 1 MG tablet     Sig: Take 1 tablet by mouth daily     Dispense:  90 tablet Refill:  3

## 2021-12-17 ENCOUNTER — HOSPITAL ENCOUNTER (OUTPATIENT)
Dept: WOMENS IMAGING | Age: 66
Discharge: HOME | End: 2021-12-17
Payer: MEDICARE

## 2021-12-17 DIAGNOSIS — Z78.0 POSTMENOPAUSAL: ICD-10-CM

## 2021-12-17 PROCEDURE — 77080 DXA BONE DENSITY AXIAL: CPT

## 2021-12-23 ENCOUNTER — NURSE ONLY (OUTPATIENT)
Dept: LAB | Age: 66
End: 2021-12-23

## 2021-12-23 DIAGNOSIS — E11.65 TYPE 2 DIABETES MELLITUS WITH HYPERGLYCEMIA, WITHOUT LONG-TERM CURRENT USE OF INSULIN (HCC): ICD-10-CM

## 2021-12-23 LAB
AVERAGE GLUCOSE: 135 MG/DL (ref 70–126)
HBA1C MFR BLD: 6.5 % (ref 4.4–6.4)

## 2022-03-24 ENCOUNTER — NURSE ONLY (OUTPATIENT)
Dept: LAB | Age: 67
End: 2022-03-24

## 2022-03-24 DIAGNOSIS — E11.65 TYPE 2 DIABETES MELLITUS WITH HYPERGLYCEMIA, WITHOUT LONG-TERM CURRENT USE OF INSULIN (HCC): ICD-10-CM

## 2022-03-25 LAB
AVERAGE GLUCOSE: 129 MG/DL (ref 70–126)
HBA1C MFR BLD: 6.3 % (ref 4.4–6.4)

## 2022-03-28 PROBLEM — L82.1 OTHER SEBORRHEIC KERATOSIS: Status: ACTIVE | Noted: 2022-03-11

## 2022-03-28 PROBLEM — E11.9 TYPE 2 DIABETES MELLITUS WITHOUT COMPLICATIONS (HCC): Status: ACTIVE | Noted: 2022-03-28

## 2022-03-28 PROBLEM — E11.65 TYPE 2 DIABETES MELLITUS WITH HYPERGLYCEMIA (HCC): Status: RESOLVED | Noted: 2021-08-16 | Resolved: 2022-03-28

## 2022-03-28 PROBLEM — Z85.3 HISTORY OF MALIGNANT NEOPLASM OF BREAST: Status: ACTIVE | Noted: 2022-03-28

## 2022-03-29 RX ORDER — LOSARTAN POTASSIUM 100 MG/1
100 TABLET ORAL DAILY
Qty: 90 TABLET | Refills: 0 | Status: SHIPPED | OUTPATIENT
Start: 2022-03-29 | End: 2022-04-20 | Stop reason: SDUPTHER

## 2022-03-29 NOTE — TELEPHONE ENCOUNTER
America Manning called requesting a refill on the following medications:  Requested Prescriptions     Pending Prescriptions Disp Refills    apixaban (ELIQUIS) 5 MG TABS tablet 180 tablet 2     Sig: Take 1 tablet by mouth 2 times daily    losartan (COZAAR) 100 MG tablet 90 tablet 2     Sig: Take 1 tablet by mouth daily     Pharmacy verified: [facility]  . pv      Date of last visit: 4/14/2021  Date of next visit (if applicable): 2/40/2035

## 2022-04-20 ENCOUNTER — OFFICE VISIT (OUTPATIENT)
Dept: CARDIOLOGY CLINIC | Age: 67
End: 2022-04-20
Payer: MEDICARE

## 2022-04-20 VITALS
WEIGHT: 232 LBS | HEIGHT: 67 IN | SYSTOLIC BLOOD PRESSURE: 142 MMHG | DIASTOLIC BLOOD PRESSURE: 78 MMHG | HEART RATE: 74 BPM | BODY MASS INDEX: 36.41 KG/M2

## 2022-04-20 DIAGNOSIS — I48.91 ATRIAL FIBRILLATION, UNSPECIFIED TYPE (HCC): Primary | ICD-10-CM

## 2022-04-20 PROCEDURE — 93000 ELECTROCARDIOGRAM COMPLETE: CPT | Performed by: INTERNAL MEDICINE

## 2022-04-20 PROCEDURE — 1123F ACP DISCUSS/DSCN MKR DOCD: CPT | Performed by: INTERNAL MEDICINE

## 2022-04-20 PROCEDURE — 3017F COLORECTAL CA SCREEN DOC REV: CPT | Performed by: INTERNAL MEDICINE

## 2022-04-20 PROCEDURE — 4040F PNEUMOC VAC/ADMIN/RCVD: CPT | Performed by: INTERNAL MEDICINE

## 2022-04-20 PROCEDURE — G8427 DOCREV CUR MEDS BY ELIG CLIN: HCPCS | Performed by: INTERNAL MEDICINE

## 2022-04-20 PROCEDURE — 1090F PRES/ABSN URINE INCON ASSESS: CPT | Performed by: INTERNAL MEDICINE

## 2022-04-20 PROCEDURE — G8417 CALC BMI ABV UP PARAM F/U: HCPCS | Performed by: INTERNAL MEDICINE

## 2022-04-20 PROCEDURE — G8399 PT W/DXA RESULTS DOCUMENT: HCPCS | Performed by: INTERNAL MEDICINE

## 2022-04-20 PROCEDURE — 1036F TOBACCO NON-USER: CPT | Performed by: INTERNAL MEDICINE

## 2022-04-20 PROCEDURE — 99214 OFFICE O/P EST MOD 30 MIN: CPT | Performed by: INTERNAL MEDICINE

## 2022-04-20 RX ORDER — CARVEDILOL 6.25 MG/1
6.25 TABLET ORAL 2 TIMES DAILY
Qty: 180 TABLET | Refills: 3 | Status: SHIPPED | OUTPATIENT
Start: 2022-04-20

## 2022-04-20 RX ORDER — LOSARTAN POTASSIUM 100 MG/1
100 TABLET ORAL DAILY
Qty: 90 TABLET | Refills: 3 | Status: SHIPPED | OUTPATIENT
Start: 2022-04-20 | End: 2022-06-28

## 2022-04-20 NOTE — PROGRESS NOTES
Pt here for 1 yr check up     EKG done today    Pt states no c/o     Pt states b/p running good at home

## 2022-04-20 NOTE — PROGRESS NOTES
[facility] [address]  [place] [address]  Dept: [phone]  Dept Fax: [phone]  Loc: [phone]    Visit Date: 4/20/2022    Ms. June Mello is a 79 y.o. female  who presented for:  Chief Complaint   Patient presents with    Check-Up    Atrial Fibrillation    Hypertension       HPI:   79year old female with a history of hx atrial fibrillation presents for follow-up. Denies any chest pain, shortness of breath, palpitations, lightheadedness, syncope. Wants to consider switching from eliquis. Current Outpatient Medications:     apixaban (ELIQUIS) 5 MG TABS tablet, Take 1 tablet by mouth 2 times daily, Disp: 180 tablet, Rfl: 0    losartan (COZAAR) 100 MG tablet, Take 1 tablet by mouth daily, Disp: 90 tablet, Rfl: 0    anastrozole (ARIMIDEX) 1 MG tablet, Take 1 tablet by mouth daily, Disp: 90 tablet, Rfl: 3    dapagliflozin (FARXIGA) 10 MG tablet, Take 1 tablet by mouth every morning, Disp: 90 tablet, Rfl: 1    metFORMIN (GLUCOPHAGE-XR) 500 MG extended release tablet, Take 2 tablets by mouth 2 times daily Start with 1 tablet once daily in am x 5 days, then 1 tablet bid.  (Patient taking differently: Take 1,000 mg by mouth daily Start with 1 tablet once daily in am x 5 days, then 1 tablet bid.), Disp: 360 tablet, Rfl: 0    carvedilol (COREG) 6.25 MG tablet, Take 1 tablet by mouth 2 times daily, Disp: 180 tablet, Rfl: 2    Multiple Vitamins-Minerals (THERAPEUTIC MULTIVITAMIN-MINERALS) tablet, Take 1 tablet by mouth daily, Disp: , Rfl:     calcium carbonate 600 MG TABS tablet, Take 1 tablet by mouth daily, Disp: , Rfl:     zoster recombinant adjuvanted vaccine (SHINGRIX) 50 MCG/0.5ML SUSR injection, Inject 0.5 mLs into the muscle See Admin Instructions 1 dose now and repeat in 2-6 months, Disp: 0.5 mL, Rfl: 0    Blood Glucose Monitoring Suppl (FREESTYLE LITE) NONA, 1 Device by Does not apply route daily, Disp: 1 Device, Rfl: 0    Lancets Ultra Fine MISC, Inject 1 Device into the skin 2 times daily, Disp: 100 each, Rfl: 3    Past Medical History  Loraine Stovall  has a past medical history of Breast cancer ([facility] [address] Cancer ([facility] [address] Diabetes mellitus ([facility] [address] Dry eye, History of therapeutic radiation, Hypertension, and Rosacea. Social History  Loraine Stovall  reports that she has never smoked. She has never used smokeless tobacco. She reports current alcohol use. She reports that she does not use drugs. Family History  Loraine Stovall family history includes Breast Cancer (age of onset: 77) in her sister; Heart Disease in her father; High Blood Pressure in her mother. Past Surgical History   Past Surgical History:   Procedure Laterality Date    BREAST BIOPSY Right 07/10/2017    [facility] wellness     BREAST LUMPECTOMY Right 7/27/2017    RIGHT LUMPECTOMY AND SENTINEL LYMPH NODE BIOPSY performed by Queta Geiger MD at [address], [facility] SURGERY Right 2014    OVARY REMOVAL Bilateral 1987       Subjective:     REVIEW OF SYSTEMS  Constitutional: denies sweats, chills and fever  HENT: denies  congestion, sinus pressure, sneezing and sore throat. Eyes: denies  pain, discharge, redness and itching. Respiratory: denies apnea, cough  Gastrointestinal: denies blood in stool, constipation, diarrhea   Endocrine: denies cold intolerance, heat intolerance, polydipsia. Genitourinary: denies dysuria, enuresis, flank pain and hematuria. Musculoskeletal: denies arthralgias, joint swelling and neck pain. Neurological: denies numbness and headaches. Psychiatric/Behavioral: denies agitation, confusion, decreased concentration and dysphoric mood    All others reviewed and are negative.    Objective:     BP (!) 163/80   Pulse 74   Ht 5' 7\" (1.702 m)   Wt 232 lb (105.2 kg)   BMI 36.34 kg/m²     Wt Readings from Last 3 Encounters:   04/20/22 232 lb (105.2 kg)   10/08/21 222 lb 9.6 oz (101 kg)   09/23/21 222 lb (100.7 kg) BP Readings from Last 3 Encounters:   04/20/22 (!) 163/80   03/28/22 (!) 144/76   10/14/21 (!) 150/74       PHYSICAL EXAM  Constitutional: Oriented to person, place, and time. Appears well-developed and well-nourished. HENT:   Head: Normocephalic and atraumatic. Eyes: EOM are normal. Pupils are equal, round, and reactive to light. Neck: Normal range of motion. Neck supple. No JVD present. Cardiovascular: Normal rate , normal heart sounds and intact distal pulses. Pulmonary/Chest: Effort normal and breath sounds normal. No respiratory distress. No wheezes. No rales. Abdominal: Soft. Bowel sounds are normal. No distension. There is no tenderness. Musculoskeletal: Normal range of motion. No edema. Neurological: Alert and oriented to person, place, and time. No cranial nerve deficit. Coordination normal.   Skin: Skin is warm and dry. Psychiatric: Normal mood and affect.        No results found for: CKTOTAL, CKMB, CKMBINDEX    Lab Results   Component Value Date    WBC 6.9 08/03/2021    RBC 5.07 08/03/2021    HGB 15.0 08/03/2021    HCT 47.6 08/03/2021    MCV 93.9 08/03/2021    MCH 29.6 08/03/2021    MCHC 31.5 08/03/2021     08/03/2021    MPV 11.0 08/03/2021       Lab Results   Component Value Date     08/03/2021    K 5.5 08/03/2021    K 4.0 04/18/2019     08/03/2021    CO2 26 08/03/2021    BUN 14 08/03/2021    LABALBU 4.6 08/03/2021    CREATININE 0.8 08/03/2021    CALCIUM 9.6 08/03/2021    LABGLOM 72 08/03/2021    GLUCOSE 293 08/03/2021       Lab Results   Component Value Date    ALKPHOS 119 08/03/2021    ALT 21 08/03/2021    AST 14 08/03/2021    PROT 7.2 08/03/2021    BILITOT 1.2 08/03/2021    LABALBU 4.6 08/03/2021       Lab Results   Component Value Date    MG 1.9 04/18/2019       Lab Results   Component Value Date    INR 1.05 04/17/2019         Lab Results   Component Value Date    LABA1C 6.3 03/24/2022       Lab Results   Component Value Date    TRIG 34 08/03/2021    HDL 80 08/03/2021    [address] 08/03/2021       Lab Results   Component Value Date    TSH 3.150 04/17/2019         Testing Reviewed:      I haveindividually reviewed the below cardiac tests    EKG:    ECHO:   Results for orders placed during the hospital encounter of 04/17/19   Echocardiogram 2D/ M-Mode/ Colorflow/ Do    Narrative Transthoracic Echocardiography Report (TTE)     Demographics      Patient Name   Roseanne Zhao  Gender              Female                  B      MR #           [id number]      Race                                                    Ethnicity      Account #      [de-identified]      Room Number         0037      Accession      [id number]      Date of Study       04/18/2019   Number      Date of Birth  1955     Referring Physician Fadumo Patel DO      Age            59 year(s)     Sonographer         Roberta Garcia,                                                     RVT                                    Interpreting        Ron Salas MD                                 Physician     Procedure    Type of Study      TTE procedure:ECHOCARDIOGRAM COMPLETE 2D W DOPPLER W COLOR. Procedure Date  Date: 04/18/2019 Start: 09:34 AM    Study Location: Bedside  Technical Quality: Adequate visualization    Indications:New onset atrial fibrillation. Additional Medical History:hypertension, history of breast cancer    Patient Status: Routine    Height: 68 inches Weight: 247.01 pounds BSA: 2.24 m^2 BMI: 37.56 kg/m^2    BP: 112/57 mmHg     Conclusions      Summary   Left ventricular size and systolic function is normal. Ejection fraction   was estimated at 55-60%. LV wall thickness is within normal limits and   there are no obvious wall motion abnormalities. The right ventricular size appears normal with normal systolic function   and wall thickness.       Signature ----------------------------------------------------------------   Electronically signed by Rahul Villegas MD (Interpreting   physician) on 04/18/2019 at 06:32 PM   ----------------------------------------------------------------      Findings      Mitral Valve   The mitral valve structure is normal with normal leaflet separation. DOPPLER: The transmitral velocity was within the normal range with no   evidence for mitral stenosis. There was no evidence of mitral   regurgitation. Aortic Valve   The aortic valve appears trileaflet with normal thickness and leaflet   excursion. DOPPLER: Transaortic velocity was within the normal range with   no evidence of aortic stenosis. There was no evidence of aortic   regurgitation. Tricuspid Valve   The tricuspid valve structure is normal with normal leaflet separation. DOPPLER: There is no evidence of tricuspid stenosis. There was no evidence   of tricuspid regurgitation. Pulmonic Valve   The pulmonic valve leaflets appear normal thickness, and normal cuspal   separation. DOPPLER: The transpulmonic velocity was within the normal   range. No evidence for regurgitation. Left Atrium   Left atrial size is normal.      Left Ventricle   Left ventricular size and systolic function is normal. Ejection fraction   was estimated at 55-60%. LV wall thickness is within normal limits and   there are no obvious wall motion abnormalities. Right Atrium   Right atrial size was normal.      Right Ventricle   The right ventricular size appears normal with normal systolic function   and wall thickness. Pericardial Effusion   The pericardium appears normal with no evidence of a pericardial effusion. Pleural Effusion   No evidence of pleural effusion. Aorta / Great Vessels   -Aortic root dimension within normal limits. -IVC size is within normal limits with normal respiratory phasic changes.      M-Mode/2D Measurements & Calculations      LV Diastolic    LV Systolic Dimension:    AV Cusp Separation: 1.9 cmLA   Dimension: 4.7  3.2 cm                    Dimension: 3.7 cmAO Root   cm              LV Volume Diastolic: 774  Dimension: 2.4 cmLA Area: 17.4   LV FS:31.9 %    ml                        cm^2   LV PW           LV Volume Systolic: 41 ml   Diastolic: 1 cm LV EDV/LV EDV Index: 102   Septum          ml/46 m^2LV ESV/LV ESV   Diastolic: 0.8  Index: 41 PB/16 m^2       RV Diastolic Dimension: 2.2 cm   cm              EF Calculated: 59.8 %                                             LA/Aorta: 1.54                                                LA volume/Index: 53.1 ml /24m^2     Doppler Measurements & Calculations      MV Peak E-Wave: 92.7 cm/s  AV Peak Velocity: 143 LVOT Peak Velocity: 91.3   MV Peak A-Wave: 105 cm/s   cm/s                  cm/s   MV E/A Ratio: 0.88         AV Peak Gradient:     LVOT Peak Gradient: 3   MV Peak Gradient: 3.44     8.18 mmHg             mmHg   mmHg                                                    TV Peak E-Wave: 50.1 cm/s   MV Deceleration Time: 306                        TV Peak A-Wave: 39.2 cm/s   msec                                                    TV Peak Gradient: 1 mmHg                                                    TR Velocity:219 cm/s   MV E' Septal Velocity: 7                         TR Gradient:19.18 mmHg   cm/s                       AV DVI (Vmax):0.64    PV Peak Velocity: 108   MV A' Septal Velocity:                           cm/s   10.4 cm/s                                        PV Peak Gradient: 4.67   MV E' Lateral Velocity:                          mmHg   8.2 cm/s   MV A' Lateral Velocity:   11.9 cm/s   E/E' septal: 13.24   E/E' lateral: 11.3     [email or web address] [id number]       STRESS:    CATH:    Assessment/Plan       Diagnosis Orders   1.  Atrial fibrillation, unspecified type ([address]  EKG 12 lead     Afib [id number]: 2  HTN    On eliquis  [id number]:2  No bleeding issues  On Coreg  Advised to monitor BP at home   States it well controlled at home  Continue rest of the management  The patient is asked to make an attempt to improve diet and exercise patterns to aid in medical management of this problem. Advised more plant based nutrition/meditarrean diet   Advised patient to call office or seek immediate medical attention if there is any new onset of  any chest pain, sob, palpitations, lightheadedness, dizziness, orthopnea, PND or pedal edema. All medication side effects were discussed in details. Thank youfor allowing me to participate in the care of this patient. Please do not hesitate to contact me for any further questions. Return in about 1 year (around 4/20/2023), or if symptoms worsen or fail to improve, for Review testing, Regular follow up.        Electronically signed by Steph Chapmagne MD [facility]  4/20/2022 at 3:25 PM

## 2022-05-25 ENCOUNTER — HOSPITAL ENCOUNTER (OUTPATIENT)
Dept: MAMMOGRAPHY | Age: 67
Discharge: HOME | End: 2022-05-25
Payer: MEDICARE

## 2022-05-25 DIAGNOSIS — Z12.39 SCREENING BREAST EXAMINATION: ICD-10-CM

## 2022-05-25 PROCEDURE — 77067 SCR MAMMO BI INCL CAD: CPT

## 2022-05-25 PROCEDURE — 77063 BREAST TOMOSYNTHESIS BI: CPT

## 2022-06-28 RX ORDER — APIXABAN 5 MG/1
TABLET, FILM COATED ORAL
Qty: 180 TABLET | Refills: 3 | Status: SHIPPED | OUTPATIENT
Start: 2022-06-28

## 2022-06-28 RX ORDER — LOSARTAN POTASSIUM 100 MG/1
TABLET ORAL
Qty: 90 TABLET | Refills: 3 | Status: SHIPPED | OUTPATIENT
Start: 2022-06-28

## 2022-09-30 ENCOUNTER — OFFICE VISIT (OUTPATIENT)
Dept: ONCOLOGY | Age: 67
End: 2022-09-30
Payer: MEDICARE

## 2022-09-30 ENCOUNTER — HOSPITAL ENCOUNTER (OUTPATIENT)
Dept: INFUSION THERAPY | Age: 67
Discharge: HOME | End: 2022-09-30
Payer: MEDICARE

## 2022-09-30 VITALS
DIASTOLIC BLOOD PRESSURE: 76 MMHG | HEART RATE: 72 BPM | TEMPERATURE: 98 F | OXYGEN SATURATION: 98 % | WEIGHT: 236 LBS | BODY MASS INDEX: 37.04 KG/M2 | RESPIRATION RATE: 16 BRPM | HEIGHT: 67 IN | SYSTOLIC BLOOD PRESSURE: 122 MMHG

## 2022-09-30 VITALS
DIASTOLIC BLOOD PRESSURE: 76 MMHG | HEART RATE: 72 BPM | RESPIRATION RATE: 16 BRPM | TEMPERATURE: 98 F | SYSTOLIC BLOOD PRESSURE: 122 MMHG

## 2022-09-30 DIAGNOSIS — Z85.3 ENCOUNTER FOR FOLLOW-UP SURVEILLANCE OF BREAST CANCER: ICD-10-CM

## 2022-09-30 DIAGNOSIS — Z79.811 PROPHYLACTIC USE OF ANASTROZOLE (ARIMIDEX): ICD-10-CM

## 2022-09-30 DIAGNOSIS — Z17.0 MALIGNANT NEOPLASM OF LOWER-OUTER QUADRANT OF RIGHT BREAST OF FEMALE, ESTROGEN RECEPTOR POSITIVE (HCC): Primary | ICD-10-CM

## 2022-09-30 DIAGNOSIS — Z08 ENCOUNTER FOR FOLLOW-UP SURVEILLANCE OF BREAST CANCER: ICD-10-CM

## 2022-09-30 DIAGNOSIS — C50.511 MALIGNANT NEOPLASM OF LOWER-OUTER QUADRANT OF RIGHT BREAST OF FEMALE, ESTROGEN RECEPTOR POSITIVE (HCC): Primary | ICD-10-CM

## 2022-09-30 PROCEDURE — 1036F TOBACCO NON-USER: CPT | Performed by: PHYSICIAN ASSISTANT

## 2022-09-30 PROCEDURE — 99214 OFFICE O/P EST MOD 30 MIN: CPT | Performed by: PHYSICIAN ASSISTANT

## 2022-09-30 PROCEDURE — G8427 DOCREV CUR MEDS BY ELIG CLIN: HCPCS | Performed by: PHYSICIAN ASSISTANT

## 2022-09-30 PROCEDURE — G8417 CALC BMI ABV UP PARAM F/U: HCPCS | Performed by: PHYSICIAN ASSISTANT

## 2022-09-30 PROCEDURE — 99211 OFF/OP EST MAY X REQ PHY/QHP: CPT

## 2022-09-30 PROCEDURE — 1090F PRES/ABSN URINE INCON ASSESS: CPT | Performed by: PHYSICIAN ASSISTANT

## 2022-09-30 PROCEDURE — 3017F COLORECTAL CA SCREEN DOC REV: CPT | Performed by: PHYSICIAN ASSISTANT

## 2022-09-30 PROCEDURE — G8399 PT W/DXA RESULTS DOCUMENT: HCPCS | Performed by: PHYSICIAN ASSISTANT

## 2022-09-30 PROCEDURE — 1123F ACP DISCUSS/DSCN MKR DOCD: CPT | Performed by: PHYSICIAN ASSISTANT

## 2022-09-30 NOTE — PROGRESS NOTES
[facility], [address] [facility]  [address] [address]  Dept: [phone]  Dept Fax: [phone]: [phone]      Visit Date:9/30/2022     Marylou Rowan is a 79 y.o. female who presents today for:   Chief Complaint   Patient presents with    Follow-up     Malignant neoplasm of lower-outer quadrant of right breast of female, estrogen receptor positive ([address]        HPI:   Marylou Rowan is a 79 y.o. female previously followed by Dr. Russ Reed for history of right breast cancer. Per Dr. Muse Snowball note on 10/8/2021: Her screening mammogram on July 6, 2017 showed 1.5 cm irregular mass in the right breast lower outer quadrant for which further evaluation with ultrasound was recommended, there was also irregular lymph node in the right breast posterior depth upper region. Bilateral breast ultrasound showed a 1.9 cm x 1.6 x 1.1 cm irregular solid mass in the right breast lower outer aspect with a lobulated lymph node with focal cortex thickening in the right axillary tail and, an intermediate suspicion for malignancy. On July 10, 2017 she underwent ultrasound guided needle core biopsy of the right breast mass and lymph node. Mass biopsy showed invasive ductal carcinoma of grade 1, ER strongly positive in 100% of cell staining,  progesterone receptor positive in 20% of cells staining and HER-2 not amplified. Needle core biopsy of the right axillary lymph node showed benign lymphoid tissue. Subsequently, she underwent lumpectomy with sentinel lymph node mapping and biopsy on July 27, 2017. Final pathology report showed:  A - Lymph node, sentinel, right axillary, excision:   Negative for malignancy. B-D - Breast, right mass, new cranial margin, and new deep margin,  excision:   Well differentiated invasive ductal carcinoma. [place] score:  1 of 3   Tumor size:  2.1 cm   DCIS: Present, closely associated with invasive component, nuclear  grade II.    Margins: Negative for malignancy (closest margin is 0.5 mm, skin,      invasive component). Pathologic stage:  PT2, (sn) pN0. Oncotype DX assay showed a recurrence score of 17 translating into 11% today and risk of disease recurrence during the next 10 years. With no benefit from systemic chemotherapy the patient proceeded with post lumpectomy radiation treatment. In October 2017 she started adjuvant hormonal therapy with Arimidex       Interval History 9/30/2022:   The patient is here for follow-up evaluation and continued surveillance of history of breast cancer. Since her last visit 1 year ago with Dr. Flavia Faulkner she states she has been feeling well. Denies any changes in her overall health. She offers no complaints today. She denies any concerns related to her breast; no nipple inversion, nipple discharge or palpable masses. She denies unintentional weight loss, poor appetite, early satiety, new headaches, worsening shortness of breath or new bone pain. She denies ED visits or hospitalizations since her last visit. PMH, SH, and FH:  I reviewed the patients medication list and allergy list as noted on the electronic medical record. The PMH, SH and FH were also reviewed as noted on the EMR. Review of Systems:   Review of Systems   Pertinent review of systems noted in HPI, all other ROS negative. Objective:   Physical Exam   /76 (Site: Left Lower Arm, Position: Sitting, Cuff Size: Medium Adult)   Pulse 72   Temp 98 °F (36.7 °C) (Oral)   Resp 16   Ht 5' 7\" (1.702 m)   Wt 236 lb (107 kg)   SpO2 98%   BMI 36.96 kg/m²    General appearance: No apparent distress, well developed and cooperative. HEENT: Pupils equal, round, and reactive to light. Conjunctivae/corneas clear. Neck: Supple, with full range of motion. Trachea midline. Respiratory:  Normal respiratory effort. Clear to auscultation, bilaterally without Rales/Wheezes/Rhonchi.   Cardiovascular: Regular rate and rhythm with normal S1/S2  Chest: s/p right lumpectomy. No evidence of lymphedema. No nipple abnormality. Mildly erythematous rash under right breast and in bilateral axilla. Abdomen: Soft, active bowel sounds. Musculoskeletal: No clubbing, cyanosis or edema bilaterally. Skin: Skin color, texture, turgor normal.  No visible rashes or lesions. Neurologic:  Neurovascularly intact without any focal sensory/motor deficits. Psychiatric: Alert and oriented      Imaging Studies and Labs:   CBC:   Lab Results   Component Value Date    WBC 6.9 08/03/2021    HGB 15.0 08/03/2021    HCT 47.6 (H) 08/03/2021    MCV 93.9 08/03/2021     08/03/2021     BMP:   Lab Results   Component Value Date/Time     08/03/2021 01:22 PM    K 5.5 08/03/2021 01:22 PM    K 4.0 04/18/2019 03:48 AM     08/03/2021 01:22 PM    CO2 26 08/03/2021 01:22 PM    BUN 14 08/03/2021 01:22 PM    CREATININE 0.8 08/03/2021 01:22 PM    GLUCOSE 293 08/03/2021 01:22 PM    CALCIUM 9.6 08/03/2021 01:22 PM      LFT:   Lab Results   Component Value Date    ALT 21 08/03/2021    AST 14 08/03/2021    ALKPHOS 119 08/03/2021    BILITOT 1.2 08/03/2021         Assessment and Plan:   1. Stage II A, hormone responsive, HER-2 negative right breast cancer  S/p right breast lumpectomy and sentinel lymph node biopsy. Oncotype DX assay showed RS was 17, placing her into low risk disease. She received post lumpectomy radiation treatment. She began adjuvant hormonal therapy with Arimidex in October 2017.     2.  Adjuvant hormonal therapy  She began Arimidex in October 2017. She will complete 5 years of arimidex therapy in October 2022. Mammogram on 5/27/2022 showed no mammographic evidence of malignancy. She will be due for mammogram May 2023. DEXA scan on 12/17/2021 showed normal bone density. She will be due for next DEXA scan in December 2023.         3.Breast cancer surveillance  Management of breast cancer survivors who have completed active treatment and have no evidence of disease are cancer surveillance, lifestyle modifications including pursuing healthy regular exercise program, minimizing alcohol intake, and refraining from smoking. Survivor follow-up will include updated history, regular physical examination. Radiologic imaging to screen for distant recurrence will be not performed unless the patient will develop new symptoms. Symptoms suspicious for recurrent /metastic disease include:  ?Constitutional symptoms - Anorexia, weight loss, malaise, fatigue, insomnia. ? Bone pain  ? Pulmonary symptoms - persistent cough or dyspnea (at rest or with exertion). ?Neurologic symptoms - Headache, nausea, vomiting, confusion, weakness, numbness or tingling. ?Gastrointestinal symptoms - Right upper quadrant pain, change in bowel habits, presence of bloody or tarry stools. Discussed one year follow up in Oncology office versus following up with PCP annually. The patient opted to follow up with PCP. Discussed importance of annual mammogram and annual clinical breast exam. She will continue monthly self breast exams. Reviewed breast cancer surveillance as above. All patient questions answered. Pt voiced understanding. Patient agreed with treatment plan. Follow up as directed. Patient instructed to call for questions or concerns.          Electronically signed by   Viri Silva PA-C

## 2022-09-30 NOTE — PATIENT INSTRUCTIONS
Patient will follow up with PCP  She was instructed to complete clinical breast exam per PCP once yearly  She will continue yearly mammogram  Call for any questions or concerns

## 2023-06-07 ENCOUNTER — OFFICE VISIT (OUTPATIENT)
Dept: CARDIOLOGY CLINIC | Age: 68
End: 2023-06-07
Payer: MEDICARE

## 2023-06-07 VITALS
WEIGHT: 246 LBS | HEART RATE: 68 BPM | DIASTOLIC BLOOD PRESSURE: 88 MMHG | SYSTOLIC BLOOD PRESSURE: 158 MMHG | HEIGHT: 67 IN | BODY MASS INDEX: 38.61 KG/M2

## 2023-06-07 DIAGNOSIS — I48.91 ATRIAL FIBRILLATION, UNSPECIFIED TYPE (HCC): Primary | ICD-10-CM

## 2023-06-07 PROCEDURE — 3017F COLORECTAL CA SCREEN DOC REV: CPT | Performed by: INTERNAL MEDICINE

## 2023-06-07 PROCEDURE — 3077F SYST BP >= 140 MM HG: CPT | Performed by: INTERNAL MEDICINE

## 2023-06-07 PROCEDURE — G8427 DOCREV CUR MEDS BY ELIG CLIN: HCPCS | Performed by: INTERNAL MEDICINE

## 2023-06-07 PROCEDURE — G8417 CALC BMI ABV UP PARAM F/U: HCPCS | Performed by: INTERNAL MEDICINE

## 2023-06-07 PROCEDURE — G8399 PT W/DXA RESULTS DOCUMENT: HCPCS | Performed by: INTERNAL MEDICINE

## 2023-06-07 PROCEDURE — 1036F TOBACCO NON-USER: CPT | Performed by: INTERNAL MEDICINE

## 2023-06-07 PROCEDURE — 99214 OFFICE O/P EST MOD 30 MIN: CPT | Performed by: INTERNAL MEDICINE

## 2023-06-07 PROCEDURE — 3079F DIAST BP 80-89 MM HG: CPT | Performed by: INTERNAL MEDICINE

## 2023-06-07 PROCEDURE — 1123F ACP DISCUSS/DSCN MKR DOCD: CPT | Performed by: INTERNAL MEDICINE

## 2023-06-07 PROCEDURE — 93000 ELECTROCARDIOGRAM COMPLETE: CPT | Performed by: INTERNAL MEDICINE

## 2023-06-07 PROCEDURE — 1090F PRES/ABSN URINE INCON ASSESS: CPT | Performed by: INTERNAL MEDICINE

## 2023-06-07 RX ORDER — CARVEDILOL 6.25 MG/1
6.25 TABLET ORAL 2 TIMES DAILY
Qty: 180 TABLET | Refills: 3 | Status: SHIPPED | OUTPATIENT
Start: 2023-06-07

## 2023-06-07 RX ORDER — LOSARTAN POTASSIUM 100 MG/1
100 TABLET ORAL DAILY
Qty: 90 TABLET | Refills: 3 | Status: SHIPPED | OUTPATIENT
Start: 2023-06-07

## 2023-06-07 NOTE — PROGRESS NOTES
Pt here for 1 yr check up     Pt continues with heart palpitations, only 2 episodes noticed over the last year    EKG done today
used smokeless tobacco. She reports current alcohol use. She reports that she does not use drugs. Family History  Rahul Padilla family history includes Breast Cancer (age of onset: 77) in her sister; Heart Disease in her father; High Blood Pressure in her mother. Past Surgical History   Past Surgical History:   Procedure Laterality Date    BREAST BIOPSY Right 07/10/2017    [facility]'s wellness     BREAST LUMPECTOMY Right 7/27/2017    RIGHT LUMPECTOMY AND SENTINEL LYMPH NODE BIOPSY performed by Tiesha Clarke MD at [address] [place] [address], [address] (CERVIX REMOVED)  1987    KNEE CARTILAGE SURGERY Right 2014    OVARY REMOVAL Bilateral 1987       Subjective:     REVIEW OF SYSTEMS  Constitutional: denies sweats, chills and fever  HENT: denies  congestion, sinus pressure, sneezing and sore throat. Eyes: denies  pain, discharge, redness and itching. Respiratory: denies apnea, cough  Gastrointestinal: denies blood in stool, constipation, diarrhea   Endocrine: denies cold intolerance, heat intolerance, polydipsia. Genitourinary: denies dysuria, enuresis, flank pain and hematuria. Musculoskeletal: denies arthralgias, joint swelling and neck pain. Neurological: denies numbness and headaches. Psychiatric/Behavioral: denies agitation, confusion, decreased concentration and dysphoric mood    All others reviewed and are negative. Objective:     BP (!) 158/88   Pulse 68   Ht 5' 7\" (1.702 m)   Wt 246 lb (111.6 kg)   BMI 38.53 kg/m²     Wt Readings from Last 3 Encounters:   06/07/23 246 lb (111.6 kg)   10/03/22 236 lb (107 kg)   09/30/22 236 lb (107 kg)     BP Readings from Last 3 Encounters:   06/07/23 (!) 158/88   04/13/23 136/82   10/03/22 128/82       PHYSICAL EXAM  Constitutional: Oriented to person, place, and time. Appears well-developed and well-nourished. HENT:   Head: Normocephalic and atraumatic. Eyes: EOM are normal. Pupils are equal, round, and reactive to light.    Neck: Normal range of

## 2023-06-28 ENCOUNTER — HOSPITAL ENCOUNTER (OUTPATIENT)
Dept: MAMMOGRAPHY | Age: 68
Discharge: HOME | End: 2023-06-28
Payer: MEDICARE

## 2023-06-28 DIAGNOSIS — Z12.39 SCREENING BREAST EXAMINATION: ICD-10-CM

## 2023-06-28 PROCEDURE — 77063 BREAST TOMOSYNTHESIS BI: CPT

## 2023-10-19 ENCOUNTER — NURSE ONLY (OUTPATIENT)
Dept: LAB | Age: 68
End: 2023-10-19

## 2023-10-19 DIAGNOSIS — I10 ESSENTIAL (PRIMARY) HYPERTENSION: ICD-10-CM

## 2023-10-19 DIAGNOSIS — Z85.3 HISTORY OF MALIGNANT NEOPLASM OF BREAST: ICD-10-CM

## 2023-10-19 DIAGNOSIS — Z00.00 MEDICARE ANNUAL WELLNESS VISIT, SUBSEQUENT: ICD-10-CM

## 2023-10-19 DIAGNOSIS — E11.9 TYPE 2 DIABETES MELLITUS WITHOUT COMPLICATION, WITHOUT LONG-TERM CURRENT USE OF INSULIN (HCC): ICD-10-CM

## 2023-10-19 LAB
ALBUMIN SERPL BCG-MCNC: 4.2 G/DL (ref 3.5–5.1)
ALP SERPL-CCNC: 73 U/L (ref 38–126)
ALT SERPL W/O P-5'-P-CCNC: 18 U/L (ref 11–66)
ANION GAP SERPL CALC-SCNC: 9 MEQ/L (ref 8–16)
AST SERPL-CCNC: 18 U/L (ref 5–40)
BASOPHILS ABSOLUTE: 0.1 THOU/MM3 (ref 0–0.1)
BASOPHILS NFR BLD AUTO: 1.4 %
BILIRUB SERPL-MCNC: 1.4 MG/DL (ref 0.3–1.2)
BUN SERPL-MCNC: 18 MG/DL (ref 7–22)
CALCIUM SERPL-MCNC: 9.1 MG/DL (ref 8.5–10.5)
CHLORIDE SERPL-SCNC: 105 MEQ/L (ref 98–111)
CHOLEST SERPL-MCNC: 116 MG/DL (ref 100–199)
CO2 SERPL-SCNC: 26 MEQ/L (ref 23–33)
CREAT SERPL-MCNC: 0.8 MG/DL (ref 0.4–1.2)
DEPRECATED RDW RBC AUTO: 45.6 FL (ref 35–45)
EOSINOPHIL NFR BLD AUTO: 2.9 %
EOSINOPHILS ABSOLUTE: 0.1 THOU/MM3 (ref 0–0.4)
ERYTHROCYTE [DISTWIDTH] IN BLOOD BY AUTOMATED COUNT: 13.5 % (ref 11.5–14.5)
GFR SERPL CREATININE-BSD FRML MDRD: > 60 ML/MIN/1.73M2
GLUCOSE SERPL-MCNC: 135 MG/DL (ref 70–108)
HCT VFR BLD AUTO: 45 % (ref 37–47)
HDLC SERPL-MCNC: 80 MG/DL
HGB BLD-MCNC: 14.9 GM/DL (ref 12–16)
IMM GRANULOCYTES # BLD AUTO: 0.01 THOU/MM3 (ref 0–0.07)
IMM GRANULOCYTES NFR BLD AUTO: 0.2 %
LDLC SERPL CALC-MCNC: 30 MG/DL
LYMPHOCYTES ABSOLUTE: 1.8 THOU/MM3 (ref 1–4.8)
LYMPHOCYTES NFR BLD AUTO: 34.8 %
MCH RBC QN AUTO: 30.5 PG (ref 26–33)
MCHC RBC AUTO-ENTMCNC: 33.1 GM/DL (ref 32.2–35.5)
MCV RBC AUTO: 92 FL (ref 81–99)
MONOCYTES ABSOLUTE: 0.5 THOU/MM3 (ref 0.4–1.3)
MONOCYTES NFR BLD AUTO: 8.9 %
NEUTROPHILS NFR BLD AUTO: 51.8 %
NRBC BLD AUTO-RTO: 0 /100 WBC
PLATELET # BLD AUTO: 305 THOU/MM3 (ref 130–400)
PMV BLD AUTO: 10.2 FL (ref 9.4–12.4)
POTASSIUM SERPL-SCNC: 4.7 MEQ/L (ref 3.5–5.2)
PROT SERPL-MCNC: 6.9 G/DL (ref 6.1–8)
RBC # BLD AUTO: 4.89 MILL/MM3 (ref 4.2–5.4)
SEGMENTED NEUTROPHILS ABSOLUTE COUNT: 2.6 THOU/MM3 (ref 1.8–7.7)
SODIUM SERPL-SCNC: 140 MEQ/L (ref 135–145)
TRIGL SERPL-MCNC: 28 MG/DL (ref 0–199)
WBC # BLD AUTO: 5.1 THOU/MM3 (ref 4.8–10.8)

## 2024-01-05 ENCOUNTER — HOSPITAL ENCOUNTER (OUTPATIENT)
Dept: WOMENS IMAGING | Age: 69
Discharge: HOME | End: 2024-01-05
Payer: MEDICARE

## 2024-01-05 VITALS — WEIGHT: 248.3 LBS | BODY MASS INDEX: 39.91 KG/M2 | HEIGHT: 66 IN

## 2024-01-05 DIAGNOSIS — Z78.0 POST-MENOPAUSAL: ICD-10-CM

## 2024-01-05 PROCEDURE — 77080 DXA BONE DENSITY AXIAL: CPT

## 2024-06-11 NOTE — PATIENT INSTRUCTIONS
Your Provider for Today: Dr. Donald  Your nurses for today: Krysta    You may receive a survey regarding the care you received during your visit.  Your input is valuable to us.  We encourage you to complete and return your survey.  We hope you will choose us in the future for your healthcare needs.

## 2024-06-12 ENCOUNTER — OFFICE VISIT (OUTPATIENT)
Dept: CARDIOLOGY CLINIC | Age: 69
End: 2024-06-12
Payer: MEDICARE

## 2024-06-12 VITALS
DIASTOLIC BLOOD PRESSURE: 82 MMHG | HEART RATE: 68 BPM | HEIGHT: 66 IN | BODY MASS INDEX: 39.86 KG/M2 | WEIGHT: 248 LBS | SYSTOLIC BLOOD PRESSURE: 140 MMHG

## 2024-06-12 DIAGNOSIS — I48.91 ATRIAL FIBRILLATION, UNSPECIFIED TYPE (HCC): Primary | ICD-10-CM

## 2024-06-12 PROCEDURE — 99214 OFFICE O/P EST MOD 30 MIN: CPT | Performed by: INTERNAL MEDICINE

## 2024-06-12 PROCEDURE — 1036F TOBACCO NON-USER: CPT | Performed by: INTERNAL MEDICINE

## 2024-06-12 PROCEDURE — 1090F PRES/ABSN URINE INCON ASSESS: CPT | Performed by: INTERNAL MEDICINE

## 2024-06-12 PROCEDURE — G8399 PT W/DXA RESULTS DOCUMENT: HCPCS | Performed by: INTERNAL MEDICINE

## 2024-06-12 PROCEDURE — 3079F DIAST BP 80-89 MM HG: CPT | Performed by: INTERNAL MEDICINE

## 2024-06-12 PROCEDURE — G8427 DOCREV CUR MEDS BY ELIG CLIN: HCPCS | Performed by: INTERNAL MEDICINE

## 2024-06-12 PROCEDURE — G8417 CALC BMI ABV UP PARAM F/U: HCPCS | Performed by: INTERNAL MEDICINE

## 2024-06-12 PROCEDURE — 3017F COLORECTAL CA SCREEN DOC REV: CPT | Performed by: INTERNAL MEDICINE

## 2024-06-12 PROCEDURE — 93000 ELECTROCARDIOGRAM COMPLETE: CPT | Performed by: INTERNAL MEDICINE

## 2024-06-12 PROCEDURE — 3077F SYST BP >= 140 MM HG: CPT | Performed by: INTERNAL MEDICINE

## 2024-06-12 PROCEDURE — 1123F ACP DISCUSS/DSCN MKR DOCD: CPT | Performed by: INTERNAL MEDICINE

## 2024-06-12 RX ORDER — PHENOL 1.4 %
1 AEROSOL, SPRAY (ML) MUCOUS MEMBRANE DAILY
COMMUNITY

## 2024-06-12 RX ORDER — LOSARTAN POTASSIUM 100 MG/1
100 TABLET ORAL DAILY
Qty: 90 TABLET | Refills: 3 | Status: SHIPPED | OUTPATIENT
Start: 2024-06-12

## 2024-06-12 RX ORDER — CARVEDILOL 6.25 MG/1
6.25 TABLET ORAL 2 TIMES DAILY
Qty: 180 TABLET | Refills: 3 | Status: SHIPPED | OUTPATIENT
Start: 2024-06-12

## 2024-06-12 NOTE — PROGRESS NOTES
[facility] PROFESSIONAL [facility] CARDIOLOGY  [address]  [address]  [place] [address]  Dept: [phone]  Dept Fax: [phone]  Loc: [phone]    Visit Date: 6/12/2024    Ms. Rhodes is a 69 y.o. female  who presented for:    HPI:   70 yo F c hx of PAF on Eliquis, HTN presents for follow-up.  Denies any chest pain, shortness of breath, palpitations, lightheadedness, syncope. Wants to consider switching from eliquis.    EKG is SR no ST-T changes today.      Current Outpatient Medications:     calcium carbonate 600 MG TABS tablet, Take 1 tablet by mouth daily, Disp: , Rfl:     Semaglutide (RYBELSUS) 7 MG TABS, Take 7 mg by mouth daily, Disp: 30 tablet, Rfl: 4    losartan (COZAAR) 100 MG tablet, Take 1 tablet by mouth daily, Disp: 90 tablet, Rfl: 3    apixaban (ELIQUIS) 5 MG TABS tablet, Take 1 tablet by mouth 2 times daily, Disp: 180 tablet, Rfl: 3    carvedilol (COREG) 6.25 MG tablet, Take 1 tablet by mouth 2 times daily, Disp: 180 tablet, Rfl: 3    triamcinolone (KENALOG) 0.1 % ointment, APPLY TO AFFECTED AREA(S) TWICE DAILY UNTIL FLAT. AVOID ON FACE AND SKIN FOLDS., Disp: , Rfl:     Multiple Vitamins-Minerals (THERAPEUTIC MULTIVITAMIN-MINERALS) tablet, Take 1 tablet by mouth daily, Disp: , Rfl:     blood glucose test strips (ASCENSIA AUTODISC VI;ONE TOUCH ULTRA TEST VI) strip, 1 each by In Vitro route daily Freestyle Lite brand, Disp: 100 each, Rfl: 3    Lancets Ultra Fine MISC, Inject 1 Device into the skin 2 times daily, Disp: 100 each, Rfl: 3    Blood Glucose Monitoring Suppl (FREESTYLE LITE) NONA, 1 Device by Does not apply route daily, Disp: 1 Device, Rfl: 0    Past Medical History  Sonya  has a past medical history of Breast cancer (HCC), Cancer (HCC), Diabetes mellitus (HCC), Dry eye, History of therapeutic radiation, Hypertension, and Rosacea.    Social History  Sonya  reports that she has never smoked. She has never used smokeless tobacco. She reports current alcohol

## 2024-08-15 ENCOUNTER — HOSPITAL ENCOUNTER (OUTPATIENT)
Dept: MAMMOGRAPHY | Age: 69
Discharge: HOME | End: 2024-08-15
Payer: MEDICARE

## 2024-08-15 DIAGNOSIS — Z12.39 BREAST SCREENING: ICD-10-CM

## 2024-08-15 PROCEDURE — 77063 BREAST TOMOSYNTHESIS BI: CPT

## 2025-06-25 ENCOUNTER — OFFICE VISIT (OUTPATIENT)
Dept: CARDIOLOGY CLINIC | Age: 70
End: 2025-06-25
Payer: MEDICARE

## 2025-06-25 VITALS
WEIGHT: 249.8 LBS | HEIGHT: 68 IN | DIASTOLIC BLOOD PRESSURE: 78 MMHG | HEART RATE: 72 BPM | SYSTOLIC BLOOD PRESSURE: 136 MMHG | BODY MASS INDEX: 37.86 KG/M2

## 2025-06-25 DIAGNOSIS — I48.91 ATRIAL FIBRILLATION, UNSPECIFIED TYPE (HCC): Primary | ICD-10-CM

## 2025-06-25 PROCEDURE — G8417 CALC BMI ABV UP PARAM F/U: HCPCS | Performed by: INTERNAL MEDICINE

## 2025-06-25 PROCEDURE — 99214 OFFICE O/P EST MOD 30 MIN: CPT | Performed by: INTERNAL MEDICINE

## 2025-06-25 PROCEDURE — 3078F DIAST BP <80 MM HG: CPT | Performed by: INTERNAL MEDICINE

## 2025-06-25 PROCEDURE — G8427 DOCREV CUR MEDS BY ELIG CLIN: HCPCS | Performed by: INTERNAL MEDICINE

## 2025-06-25 PROCEDURE — 3075F SYST BP GE 130 - 139MM HG: CPT | Performed by: INTERNAL MEDICINE

## 2025-06-25 PROCEDURE — G8399 PT W/DXA RESULTS DOCUMENT: HCPCS | Performed by: INTERNAL MEDICINE

## 2025-06-25 PROCEDURE — 1036F TOBACCO NON-USER: CPT | Performed by: INTERNAL MEDICINE

## 2025-06-25 PROCEDURE — 1123F ACP DISCUSS/DSCN MKR DOCD: CPT | Performed by: INTERNAL MEDICINE

## 2025-06-25 PROCEDURE — 1159F MED LIST DOCD IN RCRD: CPT | Performed by: INTERNAL MEDICINE

## 2025-06-25 PROCEDURE — 1090F PRES/ABSN URINE INCON ASSESS: CPT | Performed by: INTERNAL MEDICINE

## 2025-06-25 PROCEDURE — 3017F COLORECTAL CA SCREEN DOC REV: CPT | Performed by: INTERNAL MEDICINE

## 2025-06-25 RX ORDER — CARVEDILOL 6.25 MG/1
6.25 TABLET ORAL 2 TIMES DAILY
Qty: 180 TABLET | Refills: 3 | Status: SHIPPED | OUTPATIENT
Start: 2025-06-25

## 2025-06-25 RX ORDER — LOSARTAN POTASSIUM 100 MG/1
100 TABLET ORAL DAILY
Qty: 90 TABLET | Refills: 3 | Status: SHIPPED | OUTPATIENT
Start: 2025-06-25

## 2025-06-25 NOTE — PROGRESS NOTES
[facility] [place] [facility]  [facility]  [address]  [facility] [address]  Dept: [phone]  Dept Fax: [phone]  Loc: [phone]    Visit Date: 6/25/2025    Chief Complaint   Patient presents with    1 Year Follow Up    Atrial Fibrillation       HPI:   Ms. Rhodes is a 70 y.o. female  who presented for:  History of Present Illness  The patient presents for atrial fibrillation. [id number]    He continues to take Eliquis, even though he has not experienced any complications or noticeable symptoms of atrial fibrillation in the past year. He is considering reducing the dosage from twice daily to once daily. He also mentions that he was previously on baby aspirin prior to an atrial fibrillation event that occurred approximately 3 to 4 years ago.    MEDICATIONS  Eliquis      Current Outpatient Medications:     metFORMIN (GLUCOPHAGE-XR) 500 MG extended release tablet, TAKE 1 TABLET BY MOUTH WITH BREAKFAST AND WITH EVENING MEAL, Disp: 180 tablet, Rfl: 1    carvedilol (COREG) 6.25 MG tablet, Take 1 tablet by mouth 2 times daily, Disp: 180 tablet, Rfl: 3    apixaban (ELIQUIS) 5 MG TABS tablet, Take 1 tablet by mouth 2 times daily, Disp: 180 tablet, Rfl: 3    losartan (COZAAR) 100 MG tablet, Take 1 tablet by mouth daily, Disp: 90 tablet, Rfl: 3    triamcinolone (KENALOG) 0.1 % ointment, APPLY TO AFFECTED AREA(S) TWICE DAILY UNTIL FLAT. AVOID ON FACE AND SKIN FOLDS., Disp: , Rfl:     blood glucose test strips (ASCENSIA AUTODISC VI;ONE TOUCH ULTRA TEST VI) strip, 1 each by In Vitro route daily Freestyle Lite brand, Disp: 100 each, Rfl: 3    Lancets Ultra Fine MISC, Inject 1 Device into the skin 2 times daily, Disp: 100 each, Rfl: 3    Blood Glucose Monitoring Suppl (FREESTYLE LITE) NONA, 1 Device by Does not apply route daily, Disp: 1 Device, Rfl: 0    Multiple Vitamins-Minerals (THERAPEUTIC MULTIVITAMIN-MINERALS) tablet, Take 1 tablet by mouth daily, Disp: , Rfl:   Past Medical

## 2025-06-25 NOTE — PATIENT INSTRUCTIONS
Your  Nurses  Today:  Ivan    Your Provider for Today: Dr. Donald             You may receive a survey regarding the care you received during your visit.  Your input is valuable to us.  We encourage you to complete and return your survey.  We hope you will choose us in the future for your healthcare needs.

## 2025-06-26 ENCOUNTER — TELEPHONE (OUTPATIENT)
Dept: CARDIOLOGY CLINIC | Age: 70
End: 2025-06-26

## 2025-06-26 NOTE — TELEPHONE ENCOUNTER
This nurse was informed patient is asking for a script to be faxed to [facility]  Acct number provided on script.   Out for signature.

## (undated) DEVICE — APPLIER LIG CLP M L11IN TI STR RNG HNDL FOR 20 CLP DISP

## (undated) DEVICE — COVER US PRB W5XL96IN LTX W/ GEL

## (undated) DEVICE — BREAST HERNIA PACK: Brand: MEDLINE INDUSTRIES, INC.

## (undated) DEVICE — GOWN,SIRUS,NONRNF,SETINSLV,XL,20/CS: Brand: MEDLINE

## (undated) DEVICE — SOLUTION IV 1000ML 0.9% SOD CHL PH 5 INJ USP VIAFLX PLAS

## (undated) DEVICE — APPLICATOR PREP 26ML 0.7% IOD POVACRYLEX 74% ISO ALC ST

## (undated) DEVICE — SKIN AFFIX SURG ADHESIVE 72/CS 0.55ML: Brand: MEDLINE

## (undated) DEVICE — DUP USE 304928 DRESSING GZ 12 PLY 4X4 STRL

## (undated) DEVICE — 4-PORT MANIFOLD: Brand: NEPTUNE 2

## (undated) DEVICE — 60 ML SYRINGE LUER-LOCK TIP: Brand: MONOJECT

## (undated) DEVICE — SPECIMEN ORIENTATION CHARMS, SIX DISTINCTLY SHAPED STERILE 10MM CHARMS: Brand: MARGINMAP

## (undated) DEVICE — GLOVE ORANGE PI 8   MSG9080

## (undated) DEVICE — GLOVE SURG SZ 65 THK91MIL LTX FREE SYN POLYISOPRENE

## (undated) DEVICE — COVER ARMBRD W13XL28.5IN IMPERV BLU FOR OP RM